# Patient Record
Sex: FEMALE | Race: WHITE | NOT HISPANIC OR LATINO | Employment: FULL TIME | URBAN - METROPOLITAN AREA
[De-identification: names, ages, dates, MRNs, and addresses within clinical notes are randomized per-mention and may not be internally consistent; named-entity substitution may affect disease eponyms.]

---

## 2017-01-03 ENCOUNTER — TELEPHONE (OUTPATIENT)
Dept: GYNECOLOGIC ONCOLOGY | Facility: CLINIC | Age: 57
End: 2017-01-03

## 2017-01-03 NOTE — TELEPHONE ENCOUNTER
Returned pt's call from voice message saying she started having a fever yesterday morning.  States stays down as long as she is on high dose of Ibuprofen, o/w it will get as high as 101.8, she started having dysuria today but no other symptoms.  Instructed pt to call PCP there in [place] to see if she would do a UA C&S since she is local and we are in [place], call me back to let me know when she is going.  Pt verbalized understanding and will call me back.

## 2017-01-03 NOTE — TELEPHONE ENCOUNTER
Pt phoned office back.  States she has an appt at 0745 in am with Margaret Cartagena.  Instructed her to have results faxed to our office, call after visit.  Pt verbalized understanding and will call.

## 2017-01-04 ENCOUNTER — TELEPHONE (OUTPATIENT)
Dept: GYNECOLOGIC ONCOLOGY | Facility: CLINIC | Age: 57
End: 2017-01-04

## 2017-01-04 ENCOUNTER — OFFICE VISIT (OUTPATIENT)
Dept: FAMILY MEDICINE CLINIC | Facility: CLINIC | Age: 57
End: 2017-01-04

## 2017-01-04 VITALS
BODY MASS INDEX: 37.49 KG/M2 | TEMPERATURE: 97.6 F | WEIGHT: 225 LBS | OXYGEN SATURATION: 99 % | SYSTOLIC BLOOD PRESSURE: 142 MMHG | DIASTOLIC BLOOD PRESSURE: 86 MMHG | HEART RATE: 93 BPM | HEIGHT: 65 IN

## 2017-01-04 DIAGNOSIS — R30.0 DYSURIA: Primary | ICD-10-CM

## 2017-01-04 LAB
BILIRUB BLD-MCNC: ABNORMAL MG/DL
CLARITY, POC: CLEAR
COLOR UR: ABNORMAL
GLUCOSE UR STRIP-MCNC: NEGATIVE MG/DL
KETONES UR QL: ABNORMAL
LEUKOCYTE EST, POC: ABNORMAL
LEUKOCYTE ESTERASE URINE, POC: ABNORMAL
NITRITE UR-MCNC: NEGATIVE MG/ML
PH UR: 5.5 [PH] (ref 5–8)
PROT UR STRIP-MCNC: ABNORMAL MG/DL
RBC # UR STRIP: ABNORMAL /UL
RBC # UR STRIP: ABNORMAL /UL
SP GR UR: 1.03 (ref 1–1.03)
SQUAMOUS EPITHELIAL, POC: ABNORMAL
UROBILINOGEN UR QL: NORMAL

## 2017-01-04 PROCEDURE — 99213 OFFICE O/P EST LOW 20 MIN: CPT | Performed by: NURSE PRACTITIONER

## 2017-01-04 PROCEDURE — 81001 URINALYSIS AUTO W/SCOPE: CPT | Performed by: NURSE PRACTITIONER

## 2017-01-04 RX ORDER — CIPROFLOXACIN 500 MG/1
500 TABLET, FILM COATED ORAL 2 TIMES DAILY
Qty: 14 TABLET | Refills: 0 | Status: SHIPPED | OUTPATIENT
Start: 2017-01-04 | End: 2017-02-06 | Stop reason: ALTCHOICE

## 2017-01-04 RX ORDER — NITROFURANTOIN 25; 75 MG/1; MG/1
100 CAPSULE ORAL 2 TIMES DAILY
Qty: 14 CAPSULE | Refills: 0 | Status: SHIPPED | OUTPATIENT
Start: 2017-01-04 | End: 2017-01-04

## 2017-01-04 NOTE — PROGRESS NOTES
Subjective   Tracy Benz is a 56 y.o. female.     History of Present Illness     Dysuria  Mrs. Benz is here today with complaints of 2 days of fever up to 100 as well as some dysuria.  She has recently undergone a total hysterectomy via da Dinora, she called her OB/GYN oncologist yesterday who recommended she have a UA done.  She is feeling somewhat better today however she does continue to have some let her spasm type symptoms as well as dysuria.  She denies any cough, chills or body aches.  She also denies any GI symptoms.  She's had no post operative bleeding or abdominal discomfort.  The following portions of the patient's history were reviewed and updated as appropriate: allergies, current medications, past family history, past medical history, past social history, past surgical history and problem list.    Review of Systems   Constitutional: Positive for fever.   HENT: Negative.    Respiratory: Negative.    Cardiovascular: Negative.    Gastrointestinal:        She does have some suprapubic burning, described as a spasm with urination   Genitourinary: Positive for dysuria, frequency, hematuria and urgency. Negative for difficulty urinating, flank pain, pelvic pain, vaginal bleeding and vaginal pain.       Objective   Physical Exam   Constitutional: She is oriented to person, place, and time. She appears well-developed and well-nourished. No distress.   HENT:   Head: Normocephalic and atraumatic.   Eyes: Conjunctivae and EOM are normal. Pupils are equal, round, and reactive to light.   Neck: Neck supple. No JVD present.   Cardiovascular: Normal rate, regular rhythm, normal heart sounds and intact distal pulses.  Exam reveals no friction rub.    No murmur heard.  Pulmonary/Chest: Effort normal and breath sounds normal. No respiratory distress. She has no wheezes. She has no rales.   Abdominal: Soft. Bowel sounds are normal. She exhibits no distension. There is no tenderness. There is no rebound and  no guarding.   No CVA tenderness   Neurological: She is alert and oriented to person, place, and time.   Skin: Skin is warm.   Vitals reviewed.      Assessment/Plan   Tracy was seen today for urinary tract infection.    Diagnoses and all orders for this visit:    Dysuria  -     POC Urinalysis Dipstick, Automated  -     POC Micro Urine  -     Urine Culture  -     Discontinue: nitrofurantoin, macrocrystal-monohydrate, (MACROBID) 100 MG capsule; Take 1 capsule by mouth 2 (Two) Times a Day.  -     ciprofloxacin (CIPRO) 500 MG tablet; Take 1 tablet by mouth 2 (Two) Times a Day.      UA was significant with moderate leukocytes and blood and nitrite negative.  Her OB/GYN oncologist requested that we fax the results, we have called the office and discussed this with their LPN, we will go ahead and treat her, culture is pending as well.  She will let her OB/GYN oncology no if she has any further or worsening symptoms

## 2017-01-04 NOTE — MR AVS SNAPSHOT
Tracy Benz   1/4/2017 9:45 AM   Office Visit    Dept Phone:  [phone]   Encounter #:  [id number]    Provider:  IDANIA Mota   Department:  [facility]                Your Full Care Plan              Today's Medication Changes          These changes are accurate as of: 1/4/17 10:35 AM.  If you have any questions, ask your nurse or doctor.               Stop taking medication(s)listed here:     ondansetron 4 MG tablet   Commonly known as:  ZOFRAN   Stopped by:  IDANIA Mota           polyethylene glycol packet   Commonly known as:  MIRALAX   Stopped by:  IDANIA Mota                      Your Updated Medication List          This list is accurate as of: 1/4/17 10:35 AM.  Always use your most recent med list.                aspirin 81 MG EC tablet       atorvastatin 20 MG tablet   Commonly known as:  LIPITOR   Take 1 tablet by mouth daily.       CENTRUM MULTIGUMMIES chewable tablet       cholecalciferol 1000 UNITS tablet   Commonly known as:  VITAMIN D3       enalapril 5 MG tablet   Commonly known as:  VASOTEC   Take 1 tablet by mouth daily.       ibuprofen 200 MG tablet   Commonly known as:  ADVIL,MOTRIN   Take 3 tablets by mouth Every 6 (Six) Hours As Needed for mild pain (1-3).       lansoprazole 15 MG capsule   Commonly known as:  PREVACID   Take 1 capsule by mouth daily.       metoprolol succinate XL 25 MG 24 hr tablet   Commonly known as:  TOPROL-XL   Take 1.5 tablets by mouth daily.               We Performed the Following     POC Micro Urine     POC Urinalysis Dipstick, Automated     Urine Culture       You Were Diagnosed With        Codes Comments    Dysuria    -  Primary ICD-10-CM: R30.0  ICD-9-CM: 788.1       Instructions     None    Patient Instructions History      Upcoming Appointments     Visit Type Date Time Department    OFFICE VISIT 1/4/2017  9:45 AM [facility]    POST-OP  "1/27/2017  2:30 PM [facility]      MyChart Signup     Our records indicate that you have declined [facility] [employer] signup. If you would like to sign up for [employer], please email [facility]life[facility] for [email or web address] or call [phone] to obtain an activation code.             Other Info from Your Visit           Your Appointments     Jan 27, 2017  2:30 PM EST   Post-Op with Luisa Riley MD   [facility] GROUP GYNECOLOGIC ONCOLOGY (--)    [address] [id number] [phone]              Allergies     No Known Allergies      Reason for Visit     Urinary Tract Infection           Vital Signs     Blood Pressure Pulse Temperature Height Weight Last Menstrual Period    142/86 93 97.6 °F (36.4 °C) 64.5\" (163.8 cm) 225 lb (102 kg) 07/08/2013 (Approximate)    Oxygen Saturation Body Mass Index Smoking Status             99% 38.02 kg/m2 Never Smoker         Problems and Diagnoses Noted     Difficult or painful urination    -  Primary      Results     POC Urinalysis Dipstick, Automated      Component Value Standard Range & Units    Color Libia Yellow, Straw, Dark Yellow, Libia    Clarity, UA Clear Clear    Glucose, UA Negative Negative, 1000 mg/dL (3+) mg/dL    Bilirubin Small (1+) Negative    Ketones,  mg/dL Negative    Specific Gravity  1.030 1.005 - 1.030    Blood, UA Moderate Negative    pH, Urine 5.5 5.0 - 8.0    Protein,  mg/dL Negative mg/dL    Urobilinogen, UA Normal Normal    Leukocytes Small (1+) Negative    Nitrite, UA Negative Negative                POC Micro Urine      Component Value Standard Range & Units    Leukocytes, UA 5-10     Blood, UA 3+ Negative    Squam Epithel, UA 1-3                     "

## 2017-01-05 ENCOUNTER — TELEPHONE (OUTPATIENT)
Dept: GYNECOLOGIC ONCOLOGY | Facility: CLINIC | Age: 57
End: 2017-01-05

## 2017-01-05 NOTE — TELEPHONE ENCOUNTER
Phoned pt to check on her.  States she started the Cipro last night.  She has an appt on 1-27-17, will call before if needed.

## 2017-01-07 LAB
BACTERIA UR CULT: ABNORMAL
BACTERIA UR CULT: ABNORMAL
OTHER ANTIBIOTIC SUSC ISLT: ABNORMAL

## 2017-01-09 ENCOUNTER — TELEPHONE (OUTPATIENT)
Dept: FAMILY MEDICINE CLINIC | Facility: CLINIC | Age: 57
End: 2017-01-09

## 2017-01-09 NOTE — PROGRESS NOTES
Please let her know her urine did grow out Escherichia coli, which is a very common urinary tract infection.  The medication that we gave her should take care of this infection.  How she feeling?

## 2017-01-09 NOTE — TELEPHONE ENCOUNTER
----- Message from IDANIA Mota sent at 1/9/2017  7:33 AM EST -----  Please let her know her urine did grow out Escherichia coli, which is a very common urinary tract infection.  The medication that we gave her should take care of this infection.  How she feeling?

## 2017-01-09 NOTE — TELEPHONE ENCOUNTER
SHE IS STILL HAVING SOME DISCOLORED URINE ON HER PAD AND IT LOOKS BLOODY COLORED AND SHE IS HAVING SOME BLADDER SPASMS ALSO.  THE MEDICINE SHE IS TAKING IS CIPRO AND SHE THOUGHT YOU HAD TALK TO HER OTHER DOCTOR BEFORE YOU GAVE IT

## 2017-01-11 ENCOUNTER — TELEPHONE (OUTPATIENT)
Dept: GYNECOLOGIC ONCOLOGY | Facility: CLINIC | Age: 57
End: 2017-01-11

## 2017-01-11 NOTE — TELEPHONE ENCOUNTER
Returned pt's call from voice message saying she finished the Cipro but still has urinary frequency, urine has odor, hematuria, but no longer has the fever and chills.  Told her we needed to have another UA C&S done.  Offered her an appt for Friday 1-13-17.  Pt states will do the UA C&S and go from there.  She is going to call her PCP for that since she lives in [place] and her PCP is there as well.  Instructed her to call me update.  Pt verbalized understanding and will call.

## 2017-01-12 ENCOUNTER — OFFICE VISIT (OUTPATIENT)
Dept: FAMILY MEDICINE CLINIC | Facility: CLINIC | Age: 57
End: 2017-01-12

## 2017-01-12 ENCOUNTER — TELEPHONE (OUTPATIENT)
Dept: FAMILY MEDICINE CLINIC | Facility: CLINIC | Age: 57
End: 2017-01-12

## 2017-01-12 VITALS
HEIGHT: 65 IN | DIASTOLIC BLOOD PRESSURE: 78 MMHG | SYSTOLIC BLOOD PRESSURE: 122 MMHG | TEMPERATURE: 98.7 F | HEART RATE: 99 BPM | BODY MASS INDEX: 37.32 KG/M2 | WEIGHT: 224 LBS | OXYGEN SATURATION: 98 %

## 2017-01-12 DIAGNOSIS — N92.0 SPOTTING: ICD-10-CM

## 2017-01-12 DIAGNOSIS — R30.0 DYSURIA: Primary | ICD-10-CM

## 2017-01-12 LAB
BILIRUB BLD-MCNC: ABNORMAL MG/DL
CLARITY, POC: CLEAR
COLOR UR: YELLOW
GLUCOSE UR STRIP-MCNC: NEGATIVE MG/DL
KETONES UR QL: ABNORMAL
LEUKOCYTE EST, POC: ABNORMAL
LEUKOCYTE ESTERASE URINE, POC: 2
NITRITE UR-MCNC: NEGATIVE MG/ML
PH UR: 5 [PH] (ref 5–8)
PROT UR STRIP-MCNC: ABNORMAL MG/DL
RBC # UR STRIP: ABNORMAL /UL
RBC # UR STRIP: ABNORMAL /UL
SP GR UR: 1.03 (ref 1–1.03)
SQUAMOUS EPITHELIAL, POC: 10
UROBILINOGEN UR QL: NORMAL

## 2017-01-12 PROCEDURE — 99213 OFFICE O/P EST LOW 20 MIN: CPT | Performed by: NURSE PRACTITIONER

## 2017-01-12 PROCEDURE — 81001 URINALYSIS AUTO W/SCOPE: CPT | Performed by: NURSE PRACTITIONER

## 2017-01-12 RX ORDER — AMOXICILLIN AND CLAVULANATE POTASSIUM 875; 125 MG/1; MG/1
1 TABLET, FILM COATED ORAL 2 TIMES DAILY
Qty: 14 TABLET | Refills: 0 | Status: SHIPPED | OUTPATIENT
Start: 2017-01-12 | End: 2017-02-06 | Stop reason: ALTCHOICE

## 2017-01-12 RX ORDER — TRIAMCINOLONE ACETONIDE 1 MG/G
CREAM TOPICAL
COMMUNITY
Start: 2016-10-12 | End: 2017-02-06

## 2017-01-12 NOTE — PROGRESS NOTES
"Subjective   Tracy Benz is a 56 y.o. female.     History of Present Illness     Follow-up UTI  Tracy is here today to discuss some persistent problems she is having since her UTI.  Urine culture did come back positive for Escherichia coli which should've been responsive to Cipro.  She's had no further fevers but she does feel a \"twinge\" in the suprapubic area and has noticed some blood on her pad that she is wearing.  She denies any actual dysuria, urinary hesitancy or urgency.  She did call her surgeon who did her recent hysterectomy, they did offer her an appointment however she chose to come here for now.    The following portions of the patient's history were reviewed and updated as appropriate: allergies, current medications, past family history, past medical history, past social history, past surgical history and problem list.    Review of Systems   Constitutional: Negative.    Gastrointestinal: Negative for abdominal distention, abdominal pain, blood in stool, constipation, diarrhea, nausea and vomiting.   Genitourinary: Positive for hematuria (uestionable hematuria versus vaginal spotting). Negative for decreased urine volume, difficulty urinating, dysuria, flank pain, frequency, pelvic pain, urgency and vaginal pain.   Neurological: Negative.        Objective   Physical Exam   Constitutional: She is oriented to person, place, and time. She appears well-developed and well-nourished. No distress.   HENT:   Head: Normocephalic.   Abdominal: Soft. Bowel sounds are normal. She exhibits no distension. There is no tenderness. There is no rebound and no guarding.   Neurological: She is alert and oriented to person, place, and time.   Skin: Skin is warm.       Assessment/Plan   Tracy was seen today for difficulty urinating.    Diagnoses and all orders for this visit:    Dysuria  -     POC Urinalysis Dipstick, Automated  -     POC Micro Urine  -     amoxicillin-clavulanate (AUGMENTIN) 875-125 MG per tablet; " Take 1 tablet by mouth 2 (Two) Times a Day.    Spotting      She Is not actually having dysuria only a suprapubic spasm-type feeling at times.  Recent Escherichia coli UTI which should've been responsive to the Cipro according to sensitivity.  She did bring in a pad to me today and there was some spotting on the pad she's having no gushing.  UA analysis showed 2-3+ blood and trace leukocytes on strip however on microscopic exam there were too numerous to count epithelial cells 2-3 red blood cells per high-power field and occasional white blood cell.  We did not get enough urine to send for culture, I did go ahead and give her a urine cup to bring me another specimen to send for culture again later today.  I'm not certain at this spotting is coming from urinary source, she declines pelvic exam as she was told not to insert anything into her vagina at all until she sees her surgeon, she is going to call her surgeon and get an appointment set up as he did offer her one when she called there this morning.  I am going go ahead and cover her with Augmentin in the event that there was another bacteria or this is in fact urinary source, I will call her when culture and sensitivity report is received.    EMR Dragon/Transcription disclaimer:  Much of this encounter note is an electronic transcription of spoken language to printed text. Electronic transcription of spoken language may permit erroneous, or at times, nonsensical words or phrases to be inadvertently transcribed. Although I have reviewed the note for such errors, some may still exist.

## 2017-01-12 NOTE — MR AVS SNAPSHOT
Tracy Benz   1/12/2017 9:30 AM   Office Visit    Dept Phone:  [phone]   Encounter #:  [id number]    Provider:  IDANIA Mota   Department:  [facility]                Your Full Care Plan              Today's Medication Changes          These changes are accurate as of: 1/12/17 10:25 AM.  If you have any questions, ask your nurse or doctor.               New Medication(s)Ordered:     amoxicillin-clavulanate 875-125 MG per tablet   Commonly known as:  AUGMENTIN   Take 1 tablet by mouth 2 (Two) Times a Day.   Started by:  IDANIA Mota            Where to Get Your Medications      These medications were sent to [facility] [address] [place] [address] [address] AT [facility] [address] - [phone]  - [phone]   [address] [address]     Phone:  [phone]     amoxicillin-clavulanate 875-125 MG per tablet                  Your Updated Medication List          This list is accurate as of: 1/12/17 10:25 AM.  Always use your most recent med list.                amoxicillin-clavulanate 875-125 MG per tablet   Commonly known as:  AUGMENTIN   Take 1 tablet by mouth 2 (Two) Times a Day.       aspirin 81 MG EC tablet       atorvastatin 20 MG tablet   Commonly known as:  LIPITOR   Take 1 tablet by mouth daily.       CENTRUM MULTIGUMMIES chewable tablet       cholecalciferol 1000 UNITS tablet   Commonly known as:  VITAMIN D3       ciprofloxacin 500 MG tablet   Commonly known as:  CIPRO   Take 1 tablet by mouth 2 (Two) Times a Day.       enalapril 5 MG tablet   Commonly known as:  VASOTEC   Take 1 tablet by mouth daily.       ibuprofen 200 MG tablet   Commonly known as:  ADVIL,MOTRIN   Take 3 tablets by mouth Every 6 (Six) Hours As Needed for mild pain (1-3).       lansoprazole 15 MG capsule   Commonly known as:  PREVACID   Take 1 capsule by mouth daily.       metoprolol succinate  "XL 25 MG 24 hr tablet   Commonly known as:  TOPROL-XL   Take 1.5 tablets by mouth daily.       triamcinolone 0.1 % cream   Commonly known as:  KENALOG               We Performed the Following     POC Micro Urine     POC Urinalysis Dipstick, Automated       You Were Diagnosed With        Codes Comments    Dysuria    -  Primary ICD-10-CM: R30.0  ICD-9-CM: 788.1       Instructions     None    Patient Instructions History      Upcoming Appointments     Visit Type Date Time Department    SAME DAY 1/12/2017  9:30 AM [facility]    POST-OP 1/27/2017  2:30 PM [facility]      MyChart Signup     Our records indicate that you have declined [employer] signup. If you would like to sign up for [employer], please email [email or web address] or call [phone] to obtain an activation code.             Other Info from Your Visit           Your Appointments     Jan 27, 2017  2:30 PM EST   Post-Op with Luisa Riley MD   [facility]Y (--)    [address] [place] [id number] [phone]              Allergies     No Known Allergies      Reason for Visit     Difficulty Urinating           Vital Signs     Blood Pressure Pulse Temperature Height Weight Last Menstrual Period    122/78 99 98.7 °F (37.1 °C) 64.5\" (163.8 cm) 224 lb (102 kg) 07/08/2013 (Approximate)    Oxygen Saturation Body Mass Index Smoking Status             98% 37.86 kg/m2 Never Smoker         Problems and Diagnoses Noted     Difficult or painful urination    -  Primary      Results     POC Urinalysis Dipstick, Automated      Component Value Standard Range & Units    Color Yellow Yellow, Straw, Dark Yellow, Libia    Clarity, UA Clear Clear    Glucose, UA Negative Negative, 1000 mg/dL (3+) mg/dL    Bilirubin Small (1+) Negative    Ketones, UA Trace Negative    Specific Gravity  1.030 1.005 - 1.030    Blood, UA Moderate Negative    pH, Urine 5.0 5.0 - 8.0    Protein, POC " 30 mg/dL Negative mg/dL    Urobilinogen, UA Normal Normal    Leukocytes Trace Negative    Nitrite, UA Negative Negative                POC Micro Urine      Component Value Standard Range & Units    Leukocytes, UA 2     Blood, UA 3+ Negative    Squam Epithel, UA 10

## 2017-01-14 LAB
BACTERIA UR CULT: NORMAL
BACTERIA UR CULT: NORMAL

## 2017-01-16 ENCOUNTER — TELEPHONE (OUTPATIENT)
Dept: GYNECOLOGIC ONCOLOGY | Facility: CLINIC | Age: 57
End: 2017-01-16

## 2017-01-16 ENCOUNTER — TELEPHONE (OUTPATIENT)
Dept: FAMILY MEDICINE CLINIC | Facility: CLINIC | Age: 57
End: 2017-01-16

## 2017-01-16 NOTE — TELEPHONE ENCOUNTER
Returned pt's call from voice message saying she still had a little spotting.  States she no longer has the urinary issues, but still has just a little spotting which is improving.  She has an appt with Dr. Riley next week.  Offered pt an appointment for sooner, transferred to reception to schedule.

## 2017-01-16 NOTE — TELEPHONE ENCOUNTER
Called Tracy back and she said that she was waiting on the urine culture results before she called the oncologist.  Tracy also wanted to let you know that she has noticed a significant decrease in bleeding over the last 24 hours.  Tracy says she is calling the oncologist today and will call us and let us know.

## 2017-01-16 NOTE — TELEPHONE ENCOUNTER
----- Message from IDANIA Mota sent at 1/16/2017  7:32 AM EST -----  Let her know her urine did not grow out any bacteria.  How is she feeling?

## 2017-01-16 NOTE — TELEPHONE ENCOUNTER
Tracy states that she feeling pretty good today and advised that her culture didn't grow any bacteria.  She will call the clinic if anything changes.

## 2017-01-27 ENCOUNTER — OFFICE VISIT (OUTPATIENT)
Dept: GYNECOLOGIC ONCOLOGY | Facility: CLINIC | Age: 57
End: 2017-01-27

## 2017-01-27 VITALS
DIASTOLIC BLOOD PRESSURE: 88 MMHG | BODY MASS INDEX: 37.69 KG/M2 | RESPIRATION RATE: 16 BRPM | TEMPERATURE: 98.7 F | SYSTOLIC BLOOD PRESSURE: 138 MMHG | OXYGEN SATURATION: 98 % | WEIGHT: 223 LBS | HEART RATE: 93 BPM

## 2017-01-27 DIAGNOSIS — C54.1 ENDOMETRIAL CANCER (HCC): Primary | ICD-10-CM

## 2017-01-27 PROCEDURE — 99213 OFFICE O/P EST LOW 20 MIN: CPT | Performed by: OBSTETRICS & GYNECOLOGY

## 2017-01-27 PROCEDURE — 99024 POSTOP FOLLOW-UP VISIT: CPT | Performed by: OBSTETRICS & GYNECOLOGY

## 2017-01-27 NOTE — PROGRESS NOTES
"Subjective     Reason for visit:  Postoperative and pathology discussion    History of present illness:  The patient is a 56 y.o. 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  She is recently s/p minimally invasive staging surgery and returns today for a post-op visit and pathology discussion.    She is doing well from a post-operative standpoint.  Her pain has resolved and she no longer requires pain medication.  About two weeks ago she started having some scant spotting of discharge.  She denies any nausea, vomiting.  Her appetite is back to normal.  Normal bowel and bladder function.  She was treated for an EColi UTI shortly after surgery by her PCP.  No fevers or chills.  No chest pain or shortness of breath.  Energy has returned to baseline.    Apart from this she is obese and she continues her efforts at weight loss.  She has lost 20 pounds over the past year since being diagnosed with diabetes and has cut out sugars.  In terms of the type 2 diabetes, pre-op her hemoglobin A1c was 6.4%.  She manages this with diet.  She has hypertension and this is well controlled on several agents.  She recalls having a cardiac catheterization in  for a \"small blockage\".  She is been maintained on low-dose aspirin since then.  She does not follow with cardiology.  Her father  of an MI at the age of 47.  She has arthritis and takes ibuprofen regularly along with Prevacid as a protectant.    Treatment History:  1.  Presented as a referral from Dr. 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  The polypoid tissue from the lower uterine segment and endocervix showed stromal invasion.  2.  Pre-operative CT Abdomen/Pelvis was negative.  On 16 she underwent a RATLH/BSO/limited LND as intra-op frozen section showed no residual cancer  3.  Final pathology showed a small " focus of grade 1 endometrioid adenocarcinoma superficially invasive 2 mm of 2.5 cm.  No additional cervical involvement.  There were 0/5 pelvic lymph nodes sampled.  No LVSI.  Insufficient tissue for MSI testing.    OBGYN History: She is nulliparous.  No history of abnormal paps.      Past Medical History   Diagnosis Date   • Anxiety    • Arthritis    • BMI 38.0-38.9,adult    • Diabetes mellitus type 2, diet-controlled    • Endometrial cancer    • Hyperlipemia    • Hypertension    • Palpitations        Past Surgical History   Procedure Laterality Date   • Breast biopsy     • Knee arthroscopy w/ partial medial meniscectomy     • Tonsillectomy     • Laparoscopic cholecystectomy  1999   • Total laparoscopic hysterectomy N/A 12/20/2016     RATLH/BSO/Staging       MEDICATIONS: The current medication list was reviewed with the patient and updated in the EMR this date per the Medical Assistant. Medication dosages and frequencies were confirmed to be accurate.      Allergies:  has No Known Allergies.    Social History: She lives approximately 30 minutes away.  She has a cat.  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  She denies use of tobacco, alcohol, and illicit drugs.     Family History:  Her maternal uncle suffered from prostate cancer in his 60s.  Denies a history of breast, colon, endometrial, and ovarian cancer.  No history of melanoma.    Health Maintenance:    1. Mammogram - 7/2016  2. Colonoscopy - never    Review of Systems:  CONSTITUTIONAL:  Weight has been stable, no excessive fatigue.  No fever, chills, night sweats.  PSYCHIATRIC: +anxiety.  No history of depression. No bipolar disorder or insomnia.  EYES: Vision is unchanged.  No photophobia.  EARS, NOSE, MOUTH, AND THROAT: Hearing normal. No swallowing difficulties.  No sore throat.  ENDOCRINE: +DM2.  No history of thyroid disease.  LYMPHATIC: No enlarged lymph nodes  RESPIRATORY: No shortness of breath, cough, asthma or wheezing.  No hemoptysis.  CARDIOVASCULAR:  +palpitations.  No angina, orthopnea, or edema.  +HTN.  No hyperlipidemia.  GASTROINTESTINAL: No constipation, diarrhea, or melena.  No reflux.  No nausea, vomiting.  GENITOURINARY: +MARCELO.  No dysuria, hematuria, urgency, or frequency.  NEUROLOGIC: No numbness, weakness, motor disturbance, syncope, seizure, or headaches.  MUSCULOSKELETAL: +joint pain.  No muscle weakness.  INTEGUMENTARY: No new skin lesions.  GYNECOLOGIC: Per history of present illness.  HEMATOLOGIC: No bleeding problems.  Denies easy bruising.    Objective      Physical Exam  Vitals:    01/27/17 1430   BP: 138/88   Pulse: 93   Resp: 16   Temp: 98.7 °F (37.1 °C)   SpO2: 98%     Body mass index is 37.69 kg/(m^2).    Wt Readings from Last 3 Encounters:   01/27/17 223 lb (101 kg)   01/12/17 224 lb (102 kg)   01/04/17 225 lb (102 kg)     ECOG PS 0    GENERAL: Alert, well-appearing female in no apparent distress.  She appears her stated age.  She is here by herself.  HEENT: Sclera anicteric, normal eyelids. Head normocephalic, atraumatic. Mucus membranes moist.  Normal dentition.    GASTROINTESTINAL:  Soft, non-tender, non-distended, no rebound or guarding, no masses, or hernias.  No HSM.  Incisions - healing laparoscopic incisions  MUSCULOSKELETAL:  Normal gait and station.  FROMx4.  No digital cyanosis.    SKIN:  Warm, dry, well-perfused.  All visible areas intact.  No rashes, lesions, ulcers.  PSYCHIATRIC: AO x3, with appropriate affect, normal thought processes  PELVIC exam:  Normal external female genitalia without lesions or skin changes.  Urethra midline.  Vagina without lesions.  Cuff intact, healing, and without visible lesions.  On bimanual exam vagina and cuff are smooth and without nodularity.  No palpable abdominal masses. Rectovaginal exam deferred      Diagnostic Data:    Final Diagnosis   1. UTERUS, HYSTERECTOMY WITH BILATERAL SALPINGO-OOPHORECTOMY:  Small focus of superficially invasive low-grade endometrioid adenocarcinoma with squamous  metaplasia. (See template) No cervical or parametrial involvement identified.  Adenomyosis. Leiomyomata.  No significant histopathologic abnormalities of the right and left ovaries and fallopian tubes.   2. LEFT PELVIC LYMPH NODES:  No metastasis identified. (0/2)  3. RIGHT PELVIC LYMPH NODES:  No metastasis identified (0/3).  UTERUS ENDOMETRIUM TEMPLATE:  SPECIMEN TYPE/ PROCEDURE: Radical hysterectomy with node sampling  LYMPH NODE SAMPLING: yes  SPECIMEN INTEGRITY: intact  TUMOR SITE: endometrium  TUMOR SIZE (greatest dimension): Approximately 2 mm per slide measurement  HISTOLOGIC TYPE: Endometrioid adenocarcinoma with squamous differentiation  HISTOLOGIC GRADE: Grade 1  MYOMETRIAL INVASION: 2 mm of a 2.5 cm myometrial thickness  INVOLVEMENT OF CERVIX: none seen  EXTENT OF INVOLVEMENT OF OTHER ORGANS: n/a  MARGINS: free  LYMPHVASCULAR INVASION: None seen  MSI TESTING (under age 60): Insufficient tumor for testing  REGIONAL LYMPH NODE STATUS: Right and left pelvic nodes negative  ADDITIONAL PATHOLOGIC FINDINGS: Leiomyomata, adenomyosis  OTHER STUDIES: n/a  AJCC PATHOLOGIC STAGE: (COMPLETED BY PATHOLOGIST, BASED ONLY ON TISSUE FINDINGS, MORE EXTENSIVE DISEASE MAY NOT BE KNOWN TO THE PATHOLOGIST)  pT= 1a  pN= 0  AJCC PATHOLOGIC STAGE: IA         Assessment/Plan  This is a 56 y.o. 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  Postoperative:  She has done well from a post-operative standpoint.  She may resume normal activities including driving and heavy lifting.  She knows to defer sexual activity until the 8-10 week kia given the known cautery effect to the top of the vagina after robotic surgery.    2. Endometrial cancer: I reviewed the results of the pathology report in detail and valentina pictures to illustrate the findings. The patient was also given a copy of the report.  She understands she technically has a stage II disease given the small focus of endocervical stromal involvement  seen on her MyoSure pathology.  Remaining tumor features are most consistent with low risk stage 1A minimally invasive grade 1 endometrioid disease.  Her chance for cancer recurrence is approximately 3-5%.    Given the cervical involvement, I do feel she would benefit from vagina cuff brachytherapy.  I reviewed this as an adjuvant treatment modality and valentina pictures. She understands this is a well tolerated treatment that carries minimal toxicity. The goal is to treat the vaginal cuff as generally with endometrioid histology this is the most likely area for cancer to recur, if it were to recur.  I placed a referral to Dr. Livingston.  She understands typically we like to complete treatment with vaginal cuff brachytherapy within 12 weeks of surgery however do not typically start earlier than 8 weeks to allow the vaginal cuff to heal.    Thereafter she understands the typical recommendation for intermediate risk endometrial cancer surveillance (stage IB or II) are visits every 3 months for the first year, every 6 months up until 5 years, and annually thereafter.  However with the majority of her tumor features more like stage 1A disease, we can likely follow the less rigorous schedule of visits every 6 months for the first year and annually thereafter.  We discussed need to monitor symptoms including any abdominal pain, bloating, changes in bowel or bladder habits.  She knows to be particularly aware of symptoms that occur several times a week for at least several weeks.  She is to call immediately with any vaginal bleeding or abnormal discharge.  We also discussed the role of physical exam and low threshold for imaging.     3.  Routine health maintenance:  She is up to date on her mammogram.  She is due for a colonoscopy and will coordinate this with her PCP.  I would like for her to have a clinical breast exam annually.    4.  Follow-up:  RTC in 6 months.  Refer to survivorship thereafter.  She knows to call sooner with  any questions or concerns.      Modifier [id number] applies to this visit as at least 10 of 15 minutes was spent face to face counseling the patient on her cancer diagnosis, prognosis, recommendation for adjuvant treatment, and need for surveillance.          Electronically Signed by: Luisa Riley MD  Date: 1/27/2017      Time:  2:34 PM    Note: Speech recognition transcription software was used to dictate portions of this document.  An attempt at proofreading has been made though minor errors in transcription may still be present.  Please do not hesitate to call our office with any questions.

## 2017-01-27 NOTE — MR AVS SNAPSHOT
Tracy Benz   1/27/2017 2:30 PM   Office Visit    Dept Phone:  [phone]   Encounter #:  [id number]    Provider:  Luisa Riley MD   Department:  [facility] GYNECOLOGIC ONCOLOGY                Your Full Care Plan              Your Updated Medication List          This list is accurate as of: 1/27/17  3:06 PM.  Always use your most recent med list.                amoxicillin-clavulanate 875-125 MG per tablet   Commonly known as:  AUGMENTIN   Take 1 tablet by mouth 2 (Two) Times a Day.       aspirin 81 MG EC tablet       atorvastatin 20 MG tablet   Commonly known as:  LIPITOR   Take 1 tablet by mouth daily.       CENTRUM MULTIGUMMIES chewable tablet       cholecalciferol 1000 UNITS tablet   Commonly known as:  VITAMIN D3       ciprofloxacin 500 MG tablet   Commonly known as:  CIPRO   Take 1 tablet by mouth 2 (Two) Times a Day.       enalapril 5 MG tablet   Commonly known as:  VASOTEC   Take 1 tablet by mouth daily.       ibuprofen 200 MG tablet   Commonly known as:  ADVIL,MOTRIN   Take 3 tablets by mouth Every 6 (Six) Hours As Needed for mild pain (1-3).       lansoprazole 15 MG capsule   Commonly known as:  PREVACID   Take 1 capsule by mouth daily.       metoprolol succinate XL 25 MG 24 hr tablet   Commonly known as:  TOPROL-XL   Take 1.5 tablets by mouth daily.       triamcinolone 0.1 % cream   Commonly known as:  KENALOG               We Performed the Following     Ambulatory Referral to Radiation Oncology       You Were Diagnosed With        Codes Comments    Endometrial cancer    -  Primary ICD-10-CM: C54.1  ICD-9-CM: 182.0       Instructions     None    Patient Instructions History      Upcoming Appointments     Visit Type Date Time Department    POST-OP 1/27/2017  2:30 PM [facility]    RO CONSULT 2/6/2017 10:30 AM [facility]    FOLLOW UP 1 UNIT 7/28/2017  9:00 AM [facility]      MyChart Signup     Our records indicate that you have  declined Psychiatric MyChart signup. If you would like to sign up for [employer], please email [email or web address] or call [phone] to obtain an activation code.             Other Info from Your Visit           Your Appointments     Feb 06, 2017 10:30 AM EST   RO CONSULT with Maegan Livingston MD   [facility] (--)    [address] [facility] [id number] [phone]            Jul 28, 2017  9:00 AM EDT   FOLLOW UP with Luisa Riley MD   [facility] (--)    [address] [address] [phone]              Allergies     No Known Allergies      Reason for Visit     Follow-up           Vital Signs     Blood Pressure Pulse Temperature Respirations Weight Last Menstrual Period    138/88 93 98.7 °F (37.1 °C) (Temporal Artery ) 16 223 lb (101 kg) 07/08/2013 (Approximate)    Oxygen Saturation Body Mass Index Smoking Status             98% 37.69 kg/m2 Never Smoker         Problems and Diagnoses Noted     Endometrial cancer

## 2017-02-06 ENCOUNTER — OFFICE VISIT (OUTPATIENT)
Dept: RADIATION ONCOLOGY | Facility: HOSPITAL | Age: 57
End: 2017-02-06

## 2017-02-06 ENCOUNTER — HOSPITAL ENCOUNTER (OUTPATIENT)
Dept: RADIATION ONCOLOGY | Facility: HOSPITAL | Age: 57
Setting detail: RADIATION/ONCOLOGY SERIES
Discharge: HOME | End: 2017-02-06

## 2017-02-06 VITALS
WEIGHT: 225.3 LBS | TEMPERATURE: 98.1 F | BODY MASS INDEX: 37.54 KG/M2 | SYSTOLIC BLOOD PRESSURE: 182 MMHG | DIASTOLIC BLOOD PRESSURE: 81 MMHG | HEIGHT: 65 IN | HEART RATE: 88 BPM

## 2017-02-06 DIAGNOSIS — C54.1 ENDOMETRIAL CANCER (HCC): Primary | ICD-10-CM

## 2017-02-06 NOTE — PROGRESS NOTES
CONSULTATION NOTE    NAME:      Tracy Benz  :                                                          1960  DATE OF CONSULTATION:                       2017   REQUESTING PHYSICIAN:                   Luisa Riley, *  REASON FOR CONSULTATION:           Endometrial cancer          Staging form: Corpus Uteri - Carcinoma, AJCC V7           Clinical stage from 2016: FIGO Stage II (T2, N0, M0)            Pathologic stage from 2016: FIGO Stage II (T2, N0, cM0)            BRIEF HISTORY:  Tracy Benz  is a very pleasant 56 y.o. female  who was found to have postmenopausal bleeding that resolved.  She underwent hysteroscopy with D&C and intraoperatively had a friable appearing polyp protruding from external cervical os measuring at least 1 cm in diameter.  The pathology showed endometrioid adenocarcinoma FIGO grade 1, the polypoid tissue from the lower uterine segment and endocervix showed stromal invasion.  She underwent hysterectomy and was found to have a small focus of superficially invasive low grade endometrioid adenocarcinoma with no further cervical or parametrial involvement.  0 of 2 left and 0 of 3 right pelvic nodes were positive for tumor there was 2 mm of 2.5 cm myometrial thickness.  She has recovered well from surgery and is here to discuss postoperative radiotherapy.  No Known Allergies    Social History   Substance Use Topics   • Smoking status: Never Smoker   • Smokeless tobacco: Never Used   • Alcohol use No       Past Medical History   Diagnosis Date   • Anxiety    • Arthritis    • BMI 38.0-38.9,adult    • Diabetes mellitus type 2, diet-controlled    • Endometrial cancer    • Hyperlipemia    • Hypertension    • Palpitations        family history includes Diabetes type II in her father and mother; Heart attack in her father; Heart failure in her mother; Hypertension in her mother.     Past Surgical History   Procedure Laterality Date   • Breast biopsy     •  "Knee arthroscopy w/ partial medial meniscectomy     • Tonsillectomy     • Laparoscopic cholecystectomy  1999   • Total laparoscopic hysterectomy N/A 12/20/2016     RATLH/BSO/Staging        Review of Systems   All other systems reviewed and are negative.          Objective   VITAL SIGNS:   Vitals:    02/06/17 1058   BP: (!) 182/81   Pulse: 88   Temp: 98.1 °F (36.7 °C)   Weight: 225 lb 4.8 oz (102 kg)   Height: 64.5\" (163.8 cm)   PainSc: 0-No pain        KPS        90%    Physical Exam   Constitutional: She is oriented to person, place, and time. She appears well-developed and well-nourished. No distress.   HENT:   Head: Normocephalic and atraumatic.   Eyes: EOM are normal. No scleral icterus.   Neck: Neck supple.   Cardiovascular: Normal rate, regular rhythm and normal heart sounds.  Exam reveals no gallop and no friction rub.    No murmur heard.  Pulmonary/Chest: Effort normal and breath sounds normal.   Abdominal: Soft. She exhibits no distension. There is no tenderness.   Genitourinary: Vagina normal. Pelvic exam was performed with patient supine. No erythema, tenderness or bleeding in the vagina. No foreign body in the vagina. No signs of injury around the vagina. No vaginal discharge found.   Musculoskeletal: She exhibits no edema.   Lymphadenopathy:     She has no cervical adenopathy.   Neurological: She is alert and oriented to person, place, and time.   Skin: Skin is warm and dry.   Nursing note and vitals reviewed.       The following portions of the patient's history were reviewed and updated as appropriate: allergies, current medications, past family history, past medical history, past social history, past surgical history and problem list.    Assessment        IMPRESSION: Pathologic stage from 12/22/2016: FIGO Stage II (T2, N0, cM0)     RECOMMENDATIONS:  Based on the pathologic findings I recommend vaginal brachytherapy to the upper 2/3 of the vagina.  She had T2 grade 1 disease and vaginal brachytherapy " or observation is recommended.  She wants to undergo treatment.  I recommend 6 Felipe ×5 treatments to the vaginal mucosa.  The pros and cons, risks and benefits were discussed and an appointment made for her to return for treatment planning.    Return for Radiation Treatment/planning.  There are no diagnoses linked to this encounter.            Maegan Livingston MD      Errors in dictation may reflect use of voice recognition software and not all errors in transcription may have been detected prior to signing.

## 2017-02-07 RX ORDER — METOPROLOL SUCCINATE 25 MG/1
TABLET, EXTENDED RELEASE ORAL
Qty: 45 TABLET | Refills: 0 | Status: SHIPPED | OUTPATIENT
Start: 2017-02-07 | End: 2017-03-03 | Stop reason: SDUPTHER

## 2017-02-13 ENCOUNTER — DOCUMENTATION (OUTPATIENT)
Dept: RADIATION ONCOLOGY | Facility: HOSPITAL | Age: 57
End: 2017-02-13

## 2017-02-13 NOTE — PROGRESS NOTES
Pt called stating that she has allergies that effects her eyes and was given steroid drops.  She will start them today to clear the redness in her eyes.  She is to be simmed on 2/17 for cylinders x 5.

## 2017-02-17 ENCOUNTER — HOSPITAL ENCOUNTER (OUTPATIENT)
Dept: RADIATION ONCOLOGY | Facility: HOSPITAL | Age: 57
Discharge: HOME | End: 2017-02-17

## 2017-02-17 PROCEDURE — 77470 SPECIAL RADIATION TREATMENT: CPT | Performed by: RADIOLOGY

## 2017-02-17 PROCEDURE — 77290 THER RAD SIMULAJ FIELD CPLX: CPT | Performed by: RADIOLOGY

## 2017-02-20 RX ORDER — ENALAPRIL MALEATE 5 MG/1
TABLET ORAL
Qty: 30 TABLET | Refills: 0 | Status: SHIPPED | OUTPATIENT
Start: 2017-02-20 | End: 2017-03-16 | Stop reason: SDUPTHER

## 2017-02-21 PROCEDURE — 77370 RADIATION PHYSICS CONSULT: CPT | Performed by: OBSTETRICS & GYNECOLOGY

## 2017-02-21 PROCEDURE — 77316 BRACHYTX ISODOSE PLAN SIMPLE: CPT | Performed by: RADIOLOGY

## 2017-02-21 PROCEDURE — 77316 BRACHYTX ISODOSE PLAN SIMPLE: CPT | Performed by: OBSTETRICS & GYNECOLOGY

## 2017-02-22 ENCOUNTER — HOSPITAL ENCOUNTER (OUTPATIENT)
Dept: RADIATION ONCOLOGY | Facility: HOSPITAL | Age: 57
Discharge: HOME | End: 2017-02-22

## 2017-02-22 PROCEDURE — C1717 BRACHYTX, NON-STR,HDR IR-192: HCPCS | Performed by: RADIOLOGY

## 2017-02-22 PROCEDURE — 57156 INS VAG BRACHYTX DEVICE: CPT | Performed by: RADIOLOGY

## 2017-02-22 PROCEDURE — 77770 HDR RDNCL NTRSTL/ICAV BRCHTX: CPT | Performed by: RADIOLOGY

## 2017-02-22 NOTE — POST-PROCEDURE NOTE
02/22/2017  Tracy Benz    [facility]   1/5  Patient presents for cylinder brachytherapy.  She has no complaints of urinary discomfort, diarrhea or vaginal irritation.  The cylinder was inserted and treatment of 6 Gy to the surface of the upper vaginal mucosa was delivered. Patient tolerated procedure well with no complications.  Physics survey was negative with no residual radioactivity.  Discharged to home in good condition.  She will return for next treatment.

## 2017-02-24 ENCOUNTER — HOSPITAL ENCOUNTER (OUTPATIENT)
Dept: RADIATION ONCOLOGY | Facility: HOSPITAL | Age: 57
Discharge: HOME | End: 2017-02-24

## 2017-02-24 PROCEDURE — 57156 INS VAG BRACHYTX DEVICE: CPT | Performed by: RADIOLOGY

## 2017-02-24 PROCEDURE — 77770 HDR RDNCL NTRSTL/ICAV BRCHTX: CPT | Performed by: RADIOLOGY

## 2017-02-24 PROCEDURE — C1717 BRACHYTX, NON-STR,HDR IR-192: HCPCS | Performed by: RADIOLOGY

## 2017-02-24 NOTE — POST-PROCEDURE NOTE
02/24/2017  Tracy Benz    [facility]#   2/5  Patient presents for cylinder brachytherapy.  She has no complaints of urinary discomfort, diarrhea or vaginal irritation.  The cylinder was inserted and treatment of 6 Gy to the surface of the upper vaginal mucosa was delivered. Patient tolerated procedure well with no complications.  Physics survey was negative with no residual radioactivity.  Discharged to home in good condition.  She will return for next treatment.

## 2017-02-27 ENCOUNTER — HOSPITAL ENCOUNTER (OUTPATIENT)
Dept: RADIATION ONCOLOGY | Facility: HOSPITAL | Age: 57
Discharge: HOME | End: 2017-02-27

## 2017-02-27 PROCEDURE — 57156 INS VAG BRACHYTX DEVICE: CPT | Performed by: RADIOLOGY

## 2017-02-27 PROCEDURE — C1717 BRACHYTX, NON-STR,HDR IR-192: HCPCS | Performed by: RADIOLOGY

## 2017-02-27 PROCEDURE — 77770 HDR RDNCL NTRSTL/ICAV BRCHTX: CPT | Performed by: RADIOLOGY

## 2017-02-27 NOTE — POST-PROCEDURE NOTE
02/27/2017  Tracy Benz    [facility]#   3  Patient presents for cylinder brachytherapy.  She has no complaints of urinary discomfort, diarrhea or vaginal irritation.  The cylinder was inserted and treatment of 6 Gy to the surface of the upper vaginal mucosa was delivered. Patient tolerated procedure well with no complications.  Physics survey was negative with no residual radioactivity.  Discharged to home in good condition.  She will return for next treatment.

## 2017-03-01 ENCOUNTER — HOSPITAL ENCOUNTER (OUTPATIENT)
Dept: RADIATION ONCOLOGY | Facility: HOSPITAL | Age: 57
Setting detail: RADIATION/ONCOLOGY SERIES
Discharge: HOME | End: 2017-03-01

## 2017-03-01 ENCOUNTER — HOSPITAL ENCOUNTER (OUTPATIENT)
Dept: RADIATION ONCOLOGY | Facility: HOSPITAL | Age: 57
Discharge: HOME | End: 2017-03-01

## 2017-03-01 PROCEDURE — C1717 BRACHYTX, NON-STR,HDR IR-192: HCPCS | Performed by: RADIOLOGY

## 2017-03-01 PROCEDURE — 57156 INS VAG BRACHYTX DEVICE: CPT | Performed by: RADIOLOGY

## 2017-03-01 PROCEDURE — 77770 HDR RDNCL NTRSTL/ICAV BRCHTX: CPT | Performed by: RADIOLOGY

## 2017-03-03 ENCOUNTER — HOSPITAL ENCOUNTER (OUTPATIENT)
Dept: RADIATION ONCOLOGY | Facility: HOSPITAL | Age: 57
Discharge: HOME | End: 2017-03-03

## 2017-03-03 PROCEDURE — 57156 INS VAG BRACHYTX DEVICE: CPT | Performed by: RADIOLOGY

## 2017-03-03 PROCEDURE — C1717 BRACHYTX, NON-STR,HDR IR-192: HCPCS | Performed by: RADIOLOGY

## 2017-03-03 PROCEDURE — 57156 INS VAG BRACHYTX DEVICE: CPT

## 2017-03-03 PROCEDURE — 77770 HDR RDNCL NTRSTL/ICAV BRCHTX: CPT | Performed by: RADIOLOGY

## 2017-03-03 NOTE — POST-PROCEDURE NOTE
03/03/2017  Tracy Benz    [facility]#   5  Patient presents for cylinder brachytherapy.  She has no complaints of urinary discomfort, diarrhea or vaginal irritation.  The cylinder was inserted and treatment of 6 Gy to the surface of the upper vaginal mucosa was delivered. Patient tolerated procedure well with no complications.  Physics survey was negative with no residual radioactivity.  Discharged to home in good condition.  She will return  for follow up.

## 2017-03-03 NOTE — THERAPY DISCHARGE NOTE
COMPLETION NOTE    PATIENT:   Tracy Benz  :    1960  COMPLETION DATE:   17  DIAGNOSIS:   Endometrial cancer  Pathologic stage: FIGO Stage II (T2, N0, cM0)     Tracy Benz  is a very pleasant 56 y.o. female  who was found to have postmenopausal bleeding that resolved. She underwent hysteroscopy with D&C and intraoperatively had a friable appearing polyp protruding from external cervical os measuring at least 1 cm in diameter. The pathology showed endometrioid adenocarcinoma FIGO grade 1, the polypoid tissue from the lower uterine segment and endocervix showed stromal invasion. She underwent hysterectomy and was found to have a small focus of superficially invasive low grade endometrioid adenocarcinoma with no further cervical or parametrial involvement. 0 of 2 left and 0 of 3 right pelvic nodes were positive for tumor there was 2 mm of 2.5 cm myometrial thickness. She recovered well from surgery and received radiotherapy in our department as follows:    TREATMENT COURSE:   -3/3/2017 the upper vagina received 30Gy in 5 fractions utilizing a vaginal cylinder and [id number]    TOLERANCE:   She tolerated the treatment well with no complaints of vaginal irritation, diarrhea or urinary discomfort.        DISPOSITION:  At the completion of therapy an appointment was made for her to return on 3/23/17 at 11:30 AM.  She knows to call if she has any problems sooner.        Maegan Livingston MD    Errors in dictation may reflect use of voice recognition software and not all errors in transcription may have been detected prior to signing.

## 2017-03-06 RX ORDER — ATORVASTATIN CALCIUM 20 MG/1
TABLET, FILM COATED ORAL
Qty: 30 TABLET | Refills: 0 | Status: SHIPPED | OUTPATIENT
Start: 2017-03-06 | End: 2017-04-12 | Stop reason: SDUPTHER

## 2017-03-06 RX ORDER — METOPROLOL SUCCINATE 25 MG/1
TABLET, EXTENDED RELEASE ORAL
Qty: 45 TABLET | Refills: 0 | Status: SHIPPED | OUTPATIENT
Start: 2017-03-06 | End: 2017-04-12 | Stop reason: SDUPTHER

## 2017-03-17 ENCOUNTER — TELEPHONE (OUTPATIENT)
Dept: RADIATION ONCOLOGY | Facility: HOSPITAL | Age: 57
End: 2017-03-17

## 2017-03-17 RX ORDER — ENALAPRIL MALEATE 5 MG/1
TABLET ORAL
Qty: 30 TABLET | Refills: 0 | Status: SHIPPED | OUTPATIENT
Start: 2017-03-17 | End: 2017-04-12 | Stop reason: SDUPTHER

## 2017-03-17 NOTE — TELEPHONE ENCOUNTER
Pt called with complaints of vaginal itchiness and small clear discharge.  Pt wanted to make sure it was ok to use monistat since having brachytherapy.  Pt stated she is a diabetic and when she eats a lot of sugar she usually develops a yeast infection.  After discussion with Dr. Livingston, patient was ok'd to use monistat.

## 2017-03-23 ENCOUNTER — OFFICE VISIT (OUTPATIENT)
Dept: RADIATION ONCOLOGY | Facility: HOSPITAL | Age: 57
End: 2017-03-23

## 2017-03-23 VITALS
BODY MASS INDEX: 37.82 KG/M2 | DIASTOLIC BLOOD PRESSURE: 93 MMHG | RESPIRATION RATE: 18 BRPM | HEART RATE: 85 BPM | SYSTOLIC BLOOD PRESSURE: 188 MMHG | WEIGHT: 227 LBS | OXYGEN SATURATION: 98 % | HEIGHT: 65 IN | TEMPERATURE: 98.2 F

## 2017-03-23 DIAGNOSIS — C54.1 ENDOMETRIAL NEOPLASM MALIGNANT (HCC): Primary | ICD-10-CM

## 2017-04-01 RX ORDER — METOPROLOL SUCCINATE 25 MG/1
TABLET, EXTENDED RELEASE ORAL
Qty: 45 TABLET | Refills: 0 | Status: CANCELLED | OUTPATIENT
Start: 2017-04-01

## 2017-04-01 RX ORDER — ATORVASTATIN CALCIUM 20 MG/1
TABLET, FILM COATED ORAL
Qty: 30 TABLET | Refills: 0 | Status: CANCELLED | OUTPATIENT
Start: 2017-04-01

## 2017-04-09 RX ORDER — ENALAPRIL MALEATE 5 MG/1
TABLET ORAL
Qty: 30 TABLET | Refills: 0 | Status: CANCELLED | OUTPATIENT
Start: 2017-04-09

## 2017-04-12 ENCOUNTER — OFFICE VISIT (OUTPATIENT)
Dept: FAMILY MEDICINE CLINIC | Facility: CLINIC | Age: 57
End: 2017-04-12

## 2017-04-12 VITALS
HEIGHT: 65 IN | OXYGEN SATURATION: 98 % | BODY MASS INDEX: 37.32 KG/M2 | DIASTOLIC BLOOD PRESSURE: 84 MMHG | SYSTOLIC BLOOD PRESSURE: 128 MMHG | WEIGHT: 224 LBS | HEART RATE: 74 BPM

## 2017-04-12 DIAGNOSIS — I10 ESSENTIAL HYPERTENSION: ICD-10-CM

## 2017-04-12 DIAGNOSIS — E11.9 CONTROLLED TYPE 2 DIABETES MELLITUS WITHOUT COMPLICATION, WITHOUT LONG-TERM CURRENT USE OF INSULIN (HCC): ICD-10-CM

## 2017-04-12 DIAGNOSIS — E78.2 MIXED HYPERLIPIDEMIA: ICD-10-CM

## 2017-04-12 DIAGNOSIS — K21.9 GASTROESOPHAGEAL REFLUX DISEASE WITHOUT ESOPHAGITIS: ICD-10-CM

## 2017-04-12 LAB — HBA1C MFR BLD: 6 %

## 2017-04-12 PROCEDURE — 83036 HEMOGLOBIN GLYCOSYLATED A1C: CPT | Performed by: NURSE PRACTITIONER

## 2017-04-12 PROCEDURE — 99214 OFFICE O/P EST MOD 30 MIN: CPT | Performed by: NURSE PRACTITIONER

## 2017-04-12 RX ORDER — LANSOPRAZOLE 15 MG/1
15 CAPSULE, DELAYED RELEASE ORAL DAILY
Qty: 30 CAPSULE | Refills: 6 | Status: SHIPPED | OUTPATIENT
Start: 2017-04-12

## 2017-04-12 RX ORDER — ATORVASTATIN CALCIUM 20 MG/1
20 TABLET, FILM COATED ORAL DAILY
Qty: 30 TABLET | Refills: 6 | Status: SHIPPED | OUTPATIENT
Start: 2017-04-12

## 2017-04-12 RX ORDER — ENALAPRIL MALEATE 5 MG/1
5 TABLET ORAL DAILY
Qty: 30 TABLET | Refills: 6 | Status: SHIPPED | OUTPATIENT
Start: 2017-04-12

## 2017-04-12 RX ORDER — METOPROLOL SUCCINATE 25 MG/1
TABLET, EXTENDED RELEASE ORAL
Qty: 60 TABLET | Refills: 6 | Status: SHIPPED | OUTPATIENT
Start: 2017-04-12

## 2017-04-12 NOTE — PROGRESS NOTES
"Subjective   Tracy Benz is a 56 y.o. female.     History of Present Illness   Tracy is here today for follow-up for chronic conditions, denies chest pain, shortness of breath, dizziness or edema, she does occasionally have palpitations if BP is elevated. /88 at home last week, does elevate when \"stressed\".  Glucose at home runs in low 100's, denies hypoglycemic symptoms, she did see a [facility] certified diabetes educator.   The following portions of the patient's history were reviewed and updated as appropriate: allergies, current medications, past family history, past medical history, past social history, past surgical history and problem list.    Review of Systems   Constitutional: Negative.    HENT: Negative.    Eyes: Negative.    Respiratory: Negative.    Cardiovascular: Negative.    Gastrointestinal: Negative.    Endocrine: Negative.    Genitourinary: Negative.    Musculoskeletal: Negative.    Skin: Negative.    Allergic/Immunologic: Negative.    Neurological: Negative.    Hematological: Negative.    Psychiatric/Behavioral: Negative.        Objective   Physical Exam   Constitutional: She is oriented to person, place, and time. She appears well-developed and well-nourished.   HENT:   Head: Normocephalic and atraumatic.   Right Ear: External ear normal.   Left Ear: External ear normal.   Mouth/Throat: No oropharyngeal exudate.   Eyes: Conjunctivae and EOM are normal. No scleral icterus.   Neck: Normal range of motion. Neck supple. No thyromegaly present.   Cardiovascular: Normal rate, regular rhythm and normal heart sounds.    No murmur heard.  No bruit   Pulmonary/Chest: Effort normal and breath sounds normal. No respiratory distress.   Musculoskeletal: She exhibits no edema, tenderness or deformity.   Neurological: She is alert and oriented to person, place, and time.   Skin: Skin is warm and dry.   Psychiatric: She has a normal mood and affect. Her behavior is normal. Judgment and thought " content normal.   Nursing note and vitals reviewed.      Assessment/Plan   Tracy was seen today for follow-up.    Diagnoses and all orders for this visit:    Controlled type 2 diabetes mellitus without complication, without long-term current use of insulin  -     POC Glycosylated Hemoglobin (Hb A1C)    Essential hypertension  -     metoprolol succinate XL (TOPROL-XL) 25 MG 24 hr tablet; Take 1-2 PO daily  -     enalapril (VASOTEC) 5 MG tablet; Take 1 tablet by mouth Daily.    Mixed hyperlipidemia  -     atorvastatin (LIPITOR) 20 MG tablet; Take 1 tablet by mouth Daily.    Gastroesophageal reflux disease without esophagitis  -     lansoprazole (PREVACID) 15 MG capsule; Take 1 capsule by mouth Daily.      Results for orders placed or performed in visit on 04/12/17   POC Glycosylated Hemoglobin (Hb A1C)   Result Value Ref Range    Hemoglobin A1C 6.0 %     Diabetes is controlled, continue consistent carb diet, fruits, vegetables, lean meats, fish, high fiber. Recommend exercise with goal of 150 min/week.  Hypertension is controlled, continue current medications, low sodium diet.  Continue Lipitor for hyperlipidemia management, liver enzymes reviewed on labs.  GERD is controlled, continue Prevacid.  Patient was encouraged to keep me informed of any acute changes, lack of improvement, or any new concerning symptoms.  Written by Mercedes MCCORD, APRN student, acting as a scribe for IDANIA Cleary  This note accurately reflects work and decisions made by myself, Margaret PENNY

## 2017-04-14 ENCOUNTER — TELEPHONE (OUTPATIENT)
Dept: FAMILY MEDICINE CLINIC | Facility: CLINIC | Age: 57
End: 2017-04-14

## 2017-04-14 NOTE — TELEPHONE ENCOUNTER
Patient needs a refill on her 1 touch verio flex test strips and lancets but she is not sure how often she is supposed to test

## 2017-07-21 ENCOUNTER — OFFICE VISIT (OUTPATIENT)
Dept: FAMILY MEDICINE CLINIC | Facility: CLINIC | Age: 57
End: 2017-07-21

## 2017-07-21 VITALS
HEART RATE: 87 BPM | SYSTOLIC BLOOD PRESSURE: 116 MMHG | HEIGHT: 65 IN | OXYGEN SATURATION: 98 % | DIASTOLIC BLOOD PRESSURE: 78 MMHG | RESPIRATION RATE: 14 BRPM | WEIGHT: 230 LBS | TEMPERATURE: 98.6 F | BODY MASS INDEX: 38.32 KG/M2

## 2017-07-21 DIAGNOSIS — I10 ESSENTIAL HYPERTENSION: ICD-10-CM

## 2017-07-21 DIAGNOSIS — E78.2 MIXED HYPERLIPIDEMIA: ICD-10-CM

## 2017-07-21 DIAGNOSIS — E11.9 TYPE 2 DIABETES MELLITUS WITHOUT COMPLICATION, UNSPECIFIED LONG TERM INSULIN USE STATUS: Primary | ICD-10-CM

## 2017-07-21 DIAGNOSIS — Z12.39 SCREENING FOR BREAST CANCER: ICD-10-CM

## 2017-07-21 LAB
EXPIRATION DATE: ABNORMAL
HBA1C MFR BLD: 6.5 %
Lab: ABNORMAL

## 2017-07-21 PROCEDURE — 83036 HEMOGLOBIN GLYCOSYLATED A1C: CPT | Performed by: NURSE PRACTITIONER

## 2017-07-21 PROCEDURE — 99214 OFFICE O/P EST MOD 30 MIN: CPT | Performed by: NURSE PRACTITIONER

## 2017-07-21 NOTE — PROGRESS NOTES
Subjective   Tracy Benz is a 56 y.o. female.     History of Present Illness     Ms Benz comes in today for three-month follow-up on her diabetes.  She's had no problems at home with her readings however she reports she has not been eating like she should.  She is feeling well with no real complaints.  She does need an order for mammogram and a breast exam today.  She is status post total hysterectomy 6 months ago and will follow up with her oncology gynecologist next week.  The following portions of the patient's history were reviewed and updated as appropriate: allergies, current medications, past family history, past medical history, past social history, past surgical history and problem list.    Review of Systems   Constitutional: Negative.    Eyes: Negative.    Respiratory: Negative.    Cardiovascular: Negative.    Gastrointestinal: Negative.    Neurological: Negative.        Objective   Physical Exam   Constitutional: She is oriented to person, place, and time. She appears well-developed and well-nourished. No distress.   HENT:   Head: Normocephalic and atraumatic.   Mouth/Throat: Oropharynx is clear and moist.   Eyes: Conjunctivae are normal. No scleral icterus.   Neck: Normal range of motion. Neck supple.   Cardiovascular: Normal rate, regular rhythm, normal heart sounds and intact distal pulses.  Exam reveals no friction rub.    No murmur heard.  No murmurs   Pulmonary/Chest: Effort normal and breath sounds normal. No respiratory distress. She has no wheezes. She has no rales. Right breast exhibits no inverted nipple, no mass, no nipple discharge, no skin change and no tenderness. Left breast exhibits no inverted nipple, no mass, no nipple discharge, no skin change and no tenderness.   Neurological: She is alert and oriented to person, place, and time.   Skin: Skin is warm.   Vitals reviewed.      Assessment/Plan   Tracy was seen today for follow-up.    Diagnoses and all orders for this  visit:    Type 2 diabetes mellitus without complication, unspecified long term insulin use status  -     POC Glycosylated Hemoglobin (Hb A1C)  -     CBC & Differential  -     TSH    Mixed hyperlipidemia  -     Comprehensive Metabolic Panel    Essential hypertension    Screening for breast cancer  -     Mammo screening digital tomosynthesis bilateral w CAD      Hemoglobin A1c is 6.5% today.  She is still at goal however from 6, I do recommend she stick to her diabetic diet.  She will continue her regular chronic medications, we did attempt to draw labs in the office today for an unable to obtain blood therefore I did write her an order for CBC, CMP, TSH and lipid panel.

## 2017-07-28 ENCOUNTER — OFFICE VISIT (OUTPATIENT)
Dept: GYNECOLOGIC ONCOLOGY | Facility: CLINIC | Age: 57
End: 2017-07-28

## 2017-07-28 VITALS
RESPIRATION RATE: 20 BRPM | TEMPERATURE: 97.8 F | DIASTOLIC BLOOD PRESSURE: 80 MMHG | WEIGHT: 226 LBS | HEART RATE: 97 BPM | BODY MASS INDEX: 38.19 KG/M2 | SYSTOLIC BLOOD PRESSURE: 169 MMHG | OXYGEN SATURATION: 98 %

## 2017-07-28 DIAGNOSIS — C54.1 ENDOMETRIAL CANCER (HCC): Primary | ICD-10-CM

## 2017-07-28 PROCEDURE — 99213 OFFICE O/P EST LOW 20 MIN: CPT | Performed by: OBSTETRICS & GYNECOLOGY

## 2017-07-28 NOTE — PROGRESS NOTES
"Subjective     Reason for visit:  Surveillance for endometrial cancer    History of present illness:  The patient is a 57 y.o. 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  She presents today for surveillance visit.    She denies any symptoms today.  She denies vaginal bleeding or discharge.  No abdominal pain.  Admits to normal bowel and bladder function.  Denies nausea and vomiting.  Has normal appetite and energy.  Denies chest pain and shortness of breath.  Denies fevers or chills.  She has been modifying her diet and has lost some weight.  She does note some moodiness.  She states this is been fairly chronic in her lifetime and she is previously taken a variety of SSRIs.  Mild increased sensitivity.  She has been using her vaginal dilator.  Very minimal hot flashes.  Occasional night sweats.    DM2 is stable.  In terms of the type 2 diabetes, pre-op her hemoglobin A1c was 6.4%.  She manages this with diet.  She has hypertension and this is well controlled on several agents.  She recalls having a cardiac catheterization in  for a \"small blockage\".  She is been maintained on low-dose aspirin since then.  She does not follow with cardiology.  Her father  of an MI at the age of 47.  She has arthritis and takes ibuprofen regularly along with Prevacid as a protectant.    Treatment History:  1.  Presented as a referral from Dr. 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  The polypoid tissue from the lower uterine segment and endocervix showed stromal invasion.  2.  Pre-operative CT Abdomen/Pelvis was negative.  On 16 she underwent a RATLH/BSO/limited LND as intra-op frozen section showed no residual cancer  3.  Final pathology showed a small focus of grade 1 endometrioid adenocarcinoma superficially invasive 2 mm of 2.5 cm.  No additional cervical " involvement.  There were 0/5 pelvic lymph nodes sampled.  No LVSI.  Insufficient tissue for MSI testing.  4.  She completed vaginal cuff brachytherapy with Dr. Livingston.  Between 2/22-3/3/2017 the upper vagina received 30Gy in 5 fractions utilizing a vaginal cylinder.    OBGYN History: She is nulliparous.      Past Medical History:   Diagnosis Date   • Anxiety    • Arthritis    • BMI 38.0-38.9,adult    • Diabetes mellitus type 2, diet-controlled    • Endometrial cancer    • Hyperlipemia    • Hypertension    • Palpitations        Past Surgical History:   Procedure Laterality Date   • BREAST BIOPSY     • KNEE ARTHROSCOPY W/ PARTIAL MEDIAL MENISCECTOMY     • LAPAROSCOPIC CHOLECYSTECTOMY  1999   • TONSILLECTOMY     • TOTAL LAPAROSCOPIC HYSTERECTOMY N/A 12/20/2016    RATLH/BSO/Staging       MEDICATIONS: The current medication list was reviewed with the patient and updated in the EMR this date per the Medical Assistant. Medication dosages and frequencies were confirmed to be accurate.      Allergies:  has No Known Allergies.    Social History: She lives approximately 30 minutes away.  She has a cat.  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  She denies use of tobacco, alcohol, and illicit drugs.     Family History:  Her maternal uncle suffered from prostate cancer in his 60s.  Denies a history of breast, colon, endometrial, and ovarian cancer.  No history of melanoma.    Health Maintenance:    1. Mammogram - 7/2017  2. Colonoscopy - upcoming    Review of Systems:  CONSTITUTIONAL:  Weight has been stable, no excessive fatigue.  No fever, chills, night sweats.  PSYCHIATRIC: +anxiety.  No history of depression. No bipolar disorder or insomnia.  EYES: Vision is unchanged.  No photophobia.  EARS, NOSE, MOUTH, AND THROAT: Hearing normal. No swallowing difficulties.  No sore throat.  ENDOCRINE: +DM2.  No history of thyroid disease.  LYMPHATIC: No enlarged lymph nodes  RESPIRATORY: No shortness of breath, cough, asthma or wheezing.  No  hemoptysis.  CARDIOVASCULAR: +palpitations.  No angina, orthopnea, or edema.  +HTN.  No hyperlipidemia.  GASTROINTESTINAL: No constipation, diarrhea, or melena.  No reflux.  No nausea, vomiting.  GENITOURINARY: +MARCELO.  No dysuria, hematuria, urgency, or frequency.  NEUROLOGIC: No numbness, weakness, motor disturbance, syncope, seizure, or headaches.  MUSCULOSKELETAL: +joint pain.  No muscle weakness.  INTEGUMENTARY: No new skin lesions.  GYNECOLOGIC: Per history of present illness.  HEMATOLOGIC: No bleeding problems.  Denies easy bruising.    Objective      Physical Exam  Vitals:    07/28/17 0904   BP: 169/80   Pulse: 97   Resp: 20   Temp: 97.8 °F (36.6 °C)   SpO2: 98%     Body mass index is 38.19 kg/(m^2).    Wt Readings from Last 3 Encounters:   07/28/17 226 lb (103 kg)   07/21/17 230 lb (104 kg)   04/12/17 224 lb (102 kg)     ECOG PS 0    GENERAL: Alert, well-appearing female in no apparent distress.  She appears her stated age.  She is here by herself.  HEENT: Sclera anicteric, normal eyelids. Head normocephalic, atraumatic. Mucus membranes moist.  Normal dentition.    GASTROINTESTINAL:  Soft, non-tender, non-distended, no rebound or guarding, no masses, or hernias.  No HSM.  Incisions - healing laparoscopic incisions  MUSCULOSKELETAL:  Normal gait and station.  FROMx4.  No digital cyanosis.    SKIN:  Warm, dry, well-perfused.  All visible areas intact.  No rashes, lesions, ulcers.  PSYCHIATRIC: AO x3, with appropriate affect, normal thought processes  PELVIC exam: Normal external female genitalia without lesions or skin changes.  Urethra midline.  Vagina without lesions.  Mild radiation related change at the cuff.  Cuff intact, well-healed, and without visible lesions.  On bimanual exam vagina and cuff are smooth and without nodularity.  No palpable abdominal masses. Rectovaginal exam confirmatory. The pelvic sidewalls are smooth, the cul de sac is clear, the rectovaginal septum is supple.      Assessment/Plan   This is a 57 y.o. 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  Endometrial cancer:    -No evidence of disease  -Continue vaginal dilator use at least once a week  -She understands the typical recommendation for intermediate risk endometrial cancer surveillance (stage IB or II) are visits every 3 months for the first year, every 6 months up until 5 years, and annually thereafter.  However with the majority of her tumor features more like stage 1A disease, we can likely follow the less rigorous schedule of visits every 6 months for the first year and annually thereafter.  We discussed need to monitor symptoms including any abdominal pain, bloating, changes in bowel or bladder habits.  She knows to be particularly aware of symptoms that occur several times a week for at least several weeks.  She is to call immediately with any vaginal bleeding or abnormal discharge.      2.  Mood symptoms:  -She will follow-up with her primary care physician.  I gave her written information today regarding Celexa and Wellbutrin and she will review these.    3.  Routine health maintenance:  She is up to date on her mammogram.  She is due for a colonoscopy and will coordinate this with her PCP.  I would like for her to have a clinical breast exam annually.    4.  Follow-up:    -RTC in 6 months for survivorship with APRN  -She knows to call sooner with any questions or concerns.            Electronically Signed by: Luisa Riley MD  Date: 7/28/2017      Time:  10:09 AM    Note: Speech recognition transcription software was used to dictate portions of this document.  An attempt at proofreading has been made though minor errors in transcription may still be present.  Please do not hesitate to call our office with any questions.

## 2017-07-28 NOTE — PATIENT INSTRUCTIONS
Two medications to consider for your symptoms are Wellbutrin or Celexa.    Citalopram tablets  What is this medicine?  CITALOPRAM (sye ANNABEL oh pram) is a medicine for depression.  This medicine may be used for other purposes; ask your health care provider or pharmacist if you have questions.  COMMON BRAND NAME(S): Celexa  What should I tell my health care provider before I take this medicine?  They need to know if you have any of these conditions:  -bleeding disorders  -bipolar disorder or a family history of bipolar disorder  -glaucoma  -heart disease  -history of irregular heartbeat  -kidney disease  -liver disease  -low levels of magnesium or potassium in the blood  -receiving electroconvulsive therapy  -seizures  -suicidal thoughts, plans, or attempt; a previous suicide attempt by you or a family member  -take medicines that treat or prevent blood clots  -thyroid disease  -an unusual or allergic reaction to citalopram, escitalopram, other medicines, foods, dyes, or preservatives  -pregnant or trying to become pregnant  -breast-feeding  How should I use this medicine?  Take this medicine by mouth with a glass of water. Follow the directions on the prescription label. You can take it with or without food. Take your medicine at regular intervals. Do not take your medicine more often than directed. Do not stop taking this medicine suddenly except upon the advice of your doctor. Stopping this medicine too quickly may cause serious side effects or your condition may worsen.  A special MedGuide will be given to you by the pharmacist with each prescription and refill. Be sure to read this information carefully each time.  Talk to your pediatrician regarding the use of this medicine in children. Special care may be needed.  Patients over 60 years old may have a stronger reaction and need a smaller dose.  Overdosage: If you think you have taken too much of this medicine contact a poison control center or emergency room at  once.  NOTE: This medicine is only for you. Do not share this medicine with others.  What if I miss a dose?  If you miss a dose, take it as soon as you can. If it is almost time for your next dose, take only that dose. Do not take double or extra doses.  What may interact with this medicine?  Do not take this medicine with any of the following medications:  -certain medicines for fungal infections like fluconazole, itraconazole, ketoconazole, posaconazole, voriconazole  -cisapride  -dofetilide  -dronedarone  -escitalopram  -linezolid  -MAOIs like Carbex, Eldepryl, Marplan, Nardil, and Parnate  -methylene blue (injected into a vein)  -pimozide  -thioridazine  -ziprasidone  This medicine may also interact with the following medications:  -alcohol  -amphetamines  -aspirin and aspirin-like medicines  -carbamazepine  -certain medicines for depression, anxiety, or psychotic disturbances  -certain medicines for infections like chloroquine, clarithromycin, erythromycin, furazolidone, isoniazid, pentamidine  -certain medicines for migraine headaches like almotriptan, eletriptan, frovatriptan, naratriptan, rizatriptan, sumatriptan, zolmitriptan  -certain medicines for sleep  -certain medicines that treat or prevent blood clots like dalteparin, enoxaparin, warfarin  -cimetidine  -diuretics  -fentanyl  -lithium  -methadone  -metoprolol  -NSAIDs, medicines for pain and inflammation, like ibuprofen or naproxen  -omeprazole  -other medicines that prolong the QT interval (cause an abnormal heart rhythm)  -procarbazine  -rasagiline  -supplements like [place]'s wort, kava kava, valerian  -tramadol  -tryptophan  This list may not describe all possible interactions. Give your health care provider a list of all the medicines, herbs, non-prescription drugs, or dietary supplements you use. Also tell them if you smoke, drink alcohol, or use illegal drugs. Some items may interact with your medicine.  What should I watch for while using  this medicine?  Tell your doctor if your symptoms do not get better or if they get worse. Visit your doctor or health care professional for regular checks on your progress. Because it may take several weeks to see the full effects of this medicine, it is important to continue your treatment as prescribed by your doctor.  Patients and their families should watch out for new or worsening thoughts of suicide or depression. Also watch out for sudden changes in feelings such as feeling anxious, agitated, panicky, irritable, hostile, aggressive, impulsive, severely restless, overly excited and hyperactive, or not being able to sleep. If this happens, especially at the beginning of treatment or after a change in dose, call your health care professional.  You may get drowsy or dizzy. Do not drive, use machinery, or do anything that needs mental alertness until you know how this medicine affects you. Do not stand or sit up quickly, especially if you are an older patient. This reduces the risk of dizzy or fainting spells. Alcohol may interfere with the effect of this medicine. Avoid alcoholic drinks.  Your mouth may get dry. Chewing sugarless gum or sucking hard candy, and drinking plenty of water will help. Contact your doctor if the problem does not go away or is severe.  What side effects may I notice from receiving this medicine?  Side effects that you should report to your doctor or health care professional as soon as possible:  -allergic reactions like skin rash, itching or hives, swelling of the face, lips, or tongue  -black, tarry stools  -breathing problems  -changes in vision  -chest pain  -confusion  -eye pain  -fast, irregular heartbeat  -feeling faint or lightheaded, falls  -hallucination, loss of contact with reality  -loss of balance or coordination  -loss of memory  -restlessness, pacing, inability to keep still  -seizures  -stiff muscles  -suicidal thoughts or other mood changes  -trouble sleeping  -unusual  bleeding or bruising  -unusually weak or tired  -vomiting  Side effects that usually do not require medical attention (report to your doctor or health care professional if they continue or are bothersome):  -change in appetite or weight  -change in sex drive or performance  -dizziness  -headache  -increased sweating  -indigestion, nausea  -tremor  This list may not describe all possible side effects. Call your doctor for medical advice about side effects. You may report side effects to FDA at 2-445-FDA-9242.  Where should I keep my medicine?  Keep out of reach of children.  Store at room temperature between 15 and 30 degrees C (59 and 86 degrees F). Throw away any unused medicine after the expiration date.  NOTE: This sheet is a summary. It may not cover all possible information. If you have questions about this medicine, talk to your doctor, pharmacist, or health care provider.     © 2017, [employer]. (2017-02-07 11:53:44)  Bupropion sustained-release tablets (Depression/Mood Disorders)  What is this medicine?  BUPROPION (byoo PROE pee on) is used to treat depression.  This medicine may be used for other purposes; ask your health care provider or pharmacist if you have questions.  COMMON BRAND NAME(S): Budeprion SR, Wellbutrin SR  What should I tell my health care provider before I take this medicine?  They need to know if you have any of these conditions:  -an eating disorder, such as anorexia or bulimia  -bipolar disorder or psychosis  -diabetes or high blood sugar, treated with medication  -glaucoma  -head injury or brain tumor  -heart disease, previous heart attack, or irregular heart beat  -high blood pressure  -kidney or liver disease  -seizures  -suicidal thoughts or a previous suicide attempt  -Tourette's syndrome  -weight loss  -an unusual or allergic reaction to bupropion, other medicines, foods, dyes, or preservatives  -breast-feeding  -pregnant or trying to become pregnant  How should I use  this medicine?  Take this medicine by mouth with a glass of water. Follow the directions on the prescription label. You can take it with or without food. If it upsets your stomach, take it with food. Do not cut, crush or chew this medicine. Take your medicine at regular intervals. If you take this medicine more than once a day, take your second dose at least 8 hours after you take your first dose. To limit difficulty in sleeping, avoid taking this medicine at bedtime. Do not take your medicine more often than directed. Do not stop taking this medicine suddenly except upon the advice of your doctor. Stopping this medicine too quickly may cause serious side effects or your condition may worsen.  A special MedGuide will be given to you by the pharmacist with each prescription and refill. Be sure to read this information carefully each time.  Talk to your pediatrician regarding the use of this medicine in children. Special care may be needed.  Overdosage: If you think you have taken too much of this medicine contact a poison control center or emergency room at once.  NOTE: This medicine is only for you. Do not share this medicine with others.  What if I miss a dose?  If you miss a dose, skip the missed dose and take your next tablet at the regular time. There should be at least 8 hours between doses. Do not take double or extra doses.  What may interact with this medicine?  Do not take this medicine with any of the following medications:  -linezolid  -MAOIs like Azilect, Carbex, Eldepryl, Marplan, Nardil, and Parnate  -methylene blue (injected into a vein)  -other medicines that contain bupropion like Zyban  This medicine may also interact with the following medications:  -alcohol  -certain medicines for anxiety or sleep  -certain medicines for blood pressure like metoprolol, propranolol  -certain medicines for depression or psychotic disturbances  -certain medicines for HIV or AIDS like efavirenz, lopinavir,  nelfinavir, ritonavir  -certain medicines for irregular heart beat like propafenone, flecainide  -certain medicines for Parkinson's disease like amantadine, levodopa  -certain medicines for seizures like carbamazepine, phenytoin, phenobarbital  -cimetidine  -clopidogrel  -cyclophosphamide  -furazolidone  -isoniazid  -nicotine  -orphenadrine  -procarbazine  -steroid medicines like prednisone or cortisone  -stimulant medicines for attention disorders, weight loss, or to stay awake  -tamoxifen  -theophylline  -thiotepa  -ticlopidine  -tramadol  -warfarin  This list may not describe all possible interactions. Give your health care provider a list of all the medicines, herbs, non-prescription drugs, or dietary supplements you use. Also tell them if you smoke, drink alcohol, or use illegal drugs. Some items may interact with your medicine.  What should I watch for while using this medicine?  Tell your doctor if your symptoms do not get better or if they get worse. Visit your doctor or health care professional for regular checks on your progress. Because it may take several weeks to see the full effects of this medicine, it is important to continue your treatment as prescribed by your doctor.  Patients and their families should watch out for new or worsening thoughts of suicide or depression. Also watch out for sudden changes in feelings such as feeling anxious, agitated, panicky, irritable, hostile, aggressive, impulsive, severely restless, overly excited and hyperactive, or not being able to sleep. If this happens, especially at the beginning of treatment or after a change in dose, call your health care professional.  Avoid alcoholic drinks while taking this medicine. Drinking excessive alcoholic beverages, using sleeping or anxiety medicines, or quickly stopping the use of these agents while taking this medicine may increase your risk for a seizure.  Do not drive or use heavy machinery until you know how this medicine  affects you. This medicine can impair your ability to perform these tasks.  Do not take this medicine close to bedtime. It may prevent you from sleeping.  Your mouth may get dry. Chewing sugarless gum or sucking hard candy, and drinking plenty of water may help. Contact your doctor if the problem does not go away or is severe.  What side effects may I notice from receiving this medicine?  Side effects that you should report to your doctor or health care professional as soon as possible:  -allergic reactions like skin rash, itching or hives, swelling of the face, lips, or tongue  -breathing problems  -changes in vision  -confusion  -fast or irregular heartbeat  -hallucinations  -increased blood pressure  -redness, blistering, peeling or loosening of the skin, including inside the mouth  -seizures  -suicidal thoughts or other mood changes  -unusually weak or tired  -vomiting  Side effects that usually do not require medical attention (report to your doctor or health care professional if they continue or are bothersome):  -change in sex drive or performance  -constipation  -headache  -loss of appetite  -nausea  -tremors  -weight loss  This list may not describe all possible side effects. Call your doctor for medical advice about side effects. You may report side effects to FDA at 3-670-FDA-0740.  Where should I keep my medicine?  Keep out of the reach of children.  Store at room temperature between 20 and 25 degrees C (68 and 77 degrees F), away from direct sunlight and moisture. Keep tightly closed. Throw away any unused medicine after the expiration date.  NOTE: This sheet is a summary. It may not cover all possible information. If you have questions about this medicine, talk to your doctor, pharmacist, or health care provider.     © 2017, [employer]. (2014-07-11 12:41:10)

## 2017-08-04 ENCOUNTER — TELEPHONE (OUTPATIENT)
Dept: FAMILY MEDICINE CLINIC | Facility: CLINIC | Age: 57
End: 2017-08-04

## 2017-08-04 NOTE — TELEPHONE ENCOUNTER
Tracy notified and we have written an order for her to go repeat the alk phos isoenzymes at the hospital.

## 2017-08-04 NOTE — TELEPHONE ENCOUNTER
----- Message from IDANIA Mota sent at 8/4/2017  6:47 AM EDT -----  Please let her know her triglycerides are up a bit, recommend she eat heart healthy diet, more whole grains and increase exercise.    I would like her to come in for repeat alk phos with isoenzymes due to mild elevation in alkaline phos level on blood

## 2017-10-13 RX ORDER — BLOOD SUGAR DIAGNOSTIC
STRIP MISCELLANEOUS
Refills: 0 | OUTPATIENT
Start: 2017-10-13

## 2017-10-23 ENCOUNTER — OFFICE VISIT (OUTPATIENT)
Dept: RADIATION ONCOLOGY | Facility: HOSPITAL | Age: 57
End: 2017-10-23

## 2017-10-23 ENCOUNTER — HOSPITAL ENCOUNTER (OUTPATIENT)
Dept: RADIATION ONCOLOGY | Facility: HOSPITAL | Age: 57
Setting detail: RADIATION/ONCOLOGY SERIES
Discharge: HOME | End: 2017-10-23

## 2017-10-23 VITALS
TEMPERATURE: 97.8 F | HEART RATE: 85 BPM | HEIGHT: 65 IN | RESPIRATION RATE: 18 BRPM | BODY MASS INDEX: 38.69 KG/M2 | DIASTOLIC BLOOD PRESSURE: 89 MMHG | SYSTOLIC BLOOD PRESSURE: 173 MMHG | WEIGHT: 232.2 LBS

## 2017-10-23 DIAGNOSIS — C54.1 ENDOMETRIAL NEOPLASM MALIGNANT (HCC): Primary | ICD-10-CM

## 2017-10-23 PROCEDURE — G0463 HOSPITAL OUTPT CLINIC VISIT: HCPCS

## 2017-10-23 NOTE — PROGRESS NOTES
FOLLOW UP NOTE    PATIENT:                                                      Tracy Benz  MEDICAL RECORD #:                        [id number]  :                                                          1960  COMPLETION DATE:   3/3/2017  DIAGNOSIS:     Endometrial cancer    Staging form: Corpus Uteri - Carcinoma, AJCC V7    - Clinical stage from 2016: FIGO Stage II (T2, N0, M0)     - Pathologic stage from 2016: FIGO Stage II (T2, N0, cM0)     BRIEF HISTORY:    Tracy Benz  is a very pleasant 56 y.o. female  who was found to have postmenopausal bleeding that resolved. She underwent hysteroscopy with D&C and intraoperatively had a friable appearing polyp protruding from external cervical os measuring at least 1 cm in diameter. The pathology showed endometrioid adenocarcinoma FIGO grade 1, the polypoid tissue from the lower uterine segment and endocervix showed stromal invasion. She underwent hysterectomy and was found to have a small focus of superficially invasive low grade endometrioid adenocarcinoma with no further cervical or parametrial involvement. 0 of 2 left and 0 of 3 right pelvic nodes were positive for tumor there was 2 mm of 2.5 cm myometrial thickness. She recovered well from surgery and received radiotherapy in our department of:  the upper vagina received 30Gy in 5 fractions utilizing a vaginal cylinder and Iridium-192. She tolerated the treatment well with no complaints of vaginal irritation, diarrhea or urinary discomfort and has no complaints today.  She used the dilator for a while and stopped.      MEDICATIONS: Medication reconciliation for the patient was reviewed and confirmed in the electronic medical record.    Review of Systems   All other systems reviewed and are negative.      %    Physical Exam   Constitutional: She appears well-developed and well-nourished.   Neck: Neck supple.   Cardiovascular: Normal rate, regular rhythm and normal heart  "sounds.    Pulmonary/Chest: Effort normal and breath sounds normal.   Abdominal: Soft.   Genitourinary: Vagina normal.   Genitourinary Comments: Expected telangectasia at vaginal apex.     Nursing note and vitals reviewed.  Rectal exam negative.    VITAL SIGNS:   Vitals:    10/23/17 0925   BP: 173/89   Pulse: 85   Resp: 18   Temp: 97.8 °F (36.6 °C)   Weight: 232 lb 3.2 oz (105 kg)   Height: 64.5\" (163.8 cm)   PainSc: 0-No pain       The following portions of the patient's history were reviewed and updated as appropriate: allergies, current medications, past family history, past medical history, past social history, past surgical history and problem list.            IMPRESSION: No evidence of recurrent or metastatic disease     RECOMMENDATIONS:  Mrs. Benz is doing well.  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  I explained to her that this could bleed after intercourse or after using dilator and not to be concerned.  She has follow-up with Dr. Riley's office in March and we will see her back in our department as needed.           Maegan Livingston MD    Errors in dictation may reflect use of voice recognition software and not all errors in transcription may have been detected prior to signing.  "

## 2018-01-18 ENCOUNTER — CLINICAL SUPPORT (OUTPATIENT)
Dept: GYNECOLOGIC ONCOLOGY | Facility: CLINIC | Age: 58
End: 2018-01-18

## 2018-01-18 VITALS
DIASTOLIC BLOOD PRESSURE: 91 MMHG | SYSTOLIC BLOOD PRESSURE: 144 MMHG | TEMPERATURE: 98.7 F | BODY MASS INDEX: 38.53 KG/M2 | HEART RATE: 110 BPM | WEIGHT: 228 LBS | OXYGEN SATURATION: 97 % | RESPIRATION RATE: 14 BRPM

## 2018-01-18 DIAGNOSIS — C54.1 ENDOMETRIAL CANCER (HCC): Primary | ICD-10-CM

## 2018-01-18 PROCEDURE — 99214 OFFICE O/P EST MOD 30 MIN: CPT | Performed by: NURSE PRACTITIONER

## 2018-01-18 RX ORDER — IBUPROFEN 200 MG
800 TABLET ORAL DAILY
COMMUNITY
End: 2020-03-02 | Stop reason: ALTCHOICE

## 2018-01-18 NOTE — PROGRESS NOTES
GYN ONCOLOGY CANCER SURVIVORSHIP VISIT    Tracy Benz  [id number]  1960    Chief Complaint: Follow-up (survivorship, brown d/c)        History of present illness:  Tracy Benz is a 57 y.o. year old female who is here today for her Cancer Survivorship visit, see oncology history below. She reports 1 occurrence of brown spotting onto her pad last week. This occurred a few hours after having a firm BM, h/o constipation. This worried her and she used her vaginal dilator to see if there was any more blood coming from the vagina. She did not see anything else and no spotting has recurred. She denies abnormal discharge, vaginal odor, pelvic pain, or changes in bladder function. She has started Weight Watchers this week, is increasing her fiber intake, and has noticed an improvement in her constipation.   Otherwise, she is feeling very well. She has been overall pleased with her care and her outcomes.      Cancer History:      Endometrial cancer    11/30/2016 Initial Diagnosis     Hysteroscopy D&C with MyoSure and cervical polypectomy with Dr. Tilley for AUB. Pathology revealed grade 1 endometrioid adenocarcinoma and polypoid tissue from the lower uterine segment and endocervix showed stromal invasion.         12/9/2016 Imaging     Pre-OP CT negative for evidence of metastasis         12/20/2016 Surgery     RTLH/BSO with limited lymph node dissection. Frozen section showed no residual cancer. Final pathology revealed small focus of grade 1 endometrioid adenocarcinoma superficially invasive (2mm / 2.5 cm). There was no additional cervical involvement, all sampled nodes were negative, no LVSI, and tissue insufficient for MSI testing. Final Stage II         2/22/2017 - 3/3/2017 Radiation     Brachytherapy with Dr. Livingston. The upper vagina received 30 Gy in 5 fractions utilizing a vaginal cylinder.             Past Medical History:   Diagnosis Date   • Anxiety    • Arthritis    • BMI 38.0-38.9,adult    •  Diabetes mellitus type 2, diet-controlled    • Endometrial cancer    • Hyperlipemia    • Hypertension    • Palpitations        Past Surgical History:   Procedure Laterality Date   • BREAST BIOPSY     • KNEE ARTHROSCOPY W/ PARTIAL MEDIAL MENISCECTOMY     • LAPAROSCOPIC CHOLECYSTECTOMY  1999   • TONSILLECTOMY     • TOTAL LAPAROSCOPIC HYSTERECTOMY N/A 12/20/2016    RATLH/BSO/Staging       MEDICATIONS: The current medication list was reviewed and reconciled.     Allergies:  has No Known Allergies.    Family History   Problem Relation Age of Onset   • Hypertension Mother    • Diabetes type II Mother    • Heart failure Mother    • Diabetes type II Father    • Heart attack Father    • Prostate cancer Maternal Uncle 60       Last imaging study was pre-op CT 12/15/2016.   Tumor marker:  Lab Results   Component Value Date     14.0 12/19/2016       Review of Systems   Constitutional: Negative for appetite change, chills, fatigue, fever and unexpected weight change.   Respiratory: Negative for cough, shortness of breath and wheezing.    Cardiovascular: Negative for chest pain, palpitations and leg swelling.   Gastrointestinal: Negative for abdominal distention, abdominal pain, blood in stool, constipation, diarrhea, nausea and vomiting.   Endocrine: Negative.    Genitourinary: Positive for vaginal bleeding (dark brown spotting x 1 occurence). Negative for dyspareunia, dysuria, frequency, genital sores, hematuria, pelvic pain, urgency, vaginal discharge and vaginal pain.   Musculoskeletal: Negative for arthralgias, gait problem and joint swelling.   Neurological: Negative for dizziness, seizures, syncope, weakness, light-headedness, numbness and headaches.   Hematological: Negative for adenopathy.   Psychiatric/Behavioral: Negative.        Physical Exam  Vital Signs: /91  Pulse 110  Temp 98.7 °F (37.1 °C) (Temporal Artery )   Resp 14  Wt 103 kg (228 lb)  LMP 06/03/2016 (Approximate)  SpO2 97%  BMI 38.53 kg/m2    General Appearance:  alert, cooperative, no apparent distress, appears stated age and obese   Neurologic/Psychiatric: A&O x 3, gait steady, appropriate affect   HEENT:  Normocephalic, without obvious abnormality, mucous membranes moist   Neck: Supple, symmetrical, trachea midline, no adenopathy;  No thyromegaly, masses, or tenderness   Back:   Symmetric, no curvature, ROM normal, no CVA tenderness   Lungs:   Clear to auscultation bilaterally; respirations regular, even, and unlabored bilaterally   Heart:  Regular rate and rhythm, no murmurs appreciated   Breasts:  deferred   Abdomen:   Soft, non-tender, non-distended, no organomegaly and Exam limited d/t habitus.   Lymph nodes: No cervical, supraclavicular, inguinal or axillary adenopathy noted   Extremities: Normal, atraumatic; no clubbing, cyanosis, or edema    Pelvic: External Genitalia  without lesions or skin changes  Vagina  is pink, moist, without lesions.   Vaginal Cuff  Female Vaginal Cuff: smooth, intact, without visible lesions, changes consistent with previous radiation and mild friability noted without lesions  Uterus  surgically absent and no palpable masses  Ovaries  surgically absent bilaterallly  Parametria  smooth  Rectovaginal  Female rectovaginal: confirms no masses or bleeding and Hemoccult negative     ECOG Performance Status: 0 - Asymptomatic    Procedure Note:  No notes on file      Assessment and Plan:  Tracy was seen today for follow-up.    Diagnoses and all orders for this visit:    Endometrial cancer        Tracy and I discussed her occurrence of spotting last week. There are no concerning lesions and KELLY upon exam today. This was likely d/t friable radiated tissues at the vaginal cuff. She should expect some mild spotting after exam today. I encouraged regular use of vaginal dilator to maintain elasticity of tissues. She is encouraged to call for any heavy or spontaneous bleeding. Encouraged to keep bowels soft.     The patient and I  have reviewed her Survivorship Care Plan in detail. We discussed her diagnosis, pathology, histology, all treatments, and ongoing surveillance recommendations. All questions were answered to her satisfaction. She is in agreement with our plan for ongoing surveillance as outlined in her plan. A copy of this document was provided to her at the completion of our visit.  She is working to find a new PCP as her previous office has closed. She will notify us of her new provider soon and a copy of her plan can be sent for their records.     This was a 25 minute visit with 15 minutes spent in direct face to face review of her Survivorship Care Plan.    Return in about 6 months (around 7/18/2018) for ongoing cancer surveillance.      Kat Phipps, IDANIA        Note: Speech recognition transcription software was used to dictate portions of this document.  An attempt at proofreading has been made though minor errors in transcription may still be present.  Please do not hesitate to call our office with any questions.

## 2018-08-24 ENCOUNTER — OFFICE VISIT (OUTPATIENT)
Dept: GYNECOLOGIC ONCOLOGY | Facility: CLINIC | Age: 58
End: 2018-08-24

## 2018-08-24 VITALS
DIASTOLIC BLOOD PRESSURE: 82 MMHG | BODY MASS INDEX: 37.86 KG/M2 | HEART RATE: 80 BPM | TEMPERATURE: 97.7 F | RESPIRATION RATE: 16 BRPM | WEIGHT: 224 LBS | SYSTOLIC BLOOD PRESSURE: 145 MMHG | OXYGEN SATURATION: 98 %

## 2018-08-24 DIAGNOSIS — C54.1 ENDOMETRIAL CANCER (HCC): Primary | ICD-10-CM

## 2018-08-24 PROCEDURE — 99213 OFFICE O/P EST LOW 20 MIN: CPT | Performed by: NURSE PRACTITIONER

## 2018-08-24 NOTE — PROGRESS NOTES
GYN ONCOLOGY CANCER SURVEILLANCE FOLLOW-UP    Tracy Benz  [id number]  1960    Chief Complaint: Follow-up (no complaints )        History of present illness:  Tracy Benz is a 58 y.o. year old female who is here today for ongoing surveillance of Endometrial Cancer, see Cancer History. She reports she is feeling very well today and has no complaints. She denies vaginal bleeding, pelvic pain, and changes in bowel or bladder function. All well woman screenings are currently UTD.           Cancer History:      Endometrial cancer (CMS/HCC)    11/30/2016 Initial Diagnosis     Hysteroscopy D&C with MyoSure and cervical polypectomy with Dr. Tilley for AUB. Pathology revealed grade 1 endometrioid adenocarcinoma and polypoid tissue from the lower uterine segment and endocervix showed stromal invasion.         12/9/2016 Imaging     Pre-OP CT negative for evidence of metastasis         12/20/2016 Surgery     RTLH/BSO with limited lymph node dissection. Frozen section showed no residual cancer. Final pathology revealed small focus of grade 1 endometrioid adenocarcinoma superficially invasive (2mm / 2.5 cm). There was no additional cervical involvement, all sampled nodes were negative, no LVSI, and tissue insufficient for MSI testing. Final Stage II         2/22/2017 - 3/3/2017 Radiation     Brachytherapy with Dr. Livingston. The upper vagina received 30 Gy in 5 fractions utilizing a vaginal cylinder.          1/18/2018 Survivorship     Survivorship Care Plan completed and discussed with patient.  Copy of Survivorship Care Plan provided to patient and primary care provider.             Past Medical History:   Diagnosis Date   • Anxiety    • Arthritis    • BMI 38.0-38.9,adult    • Diabetes mellitus type 2, diet-controlled (CMS/HCC)    • Endometrial cancer (CMS/HCC)    • Hyperlipemia    • Hypertension    • Palpitations        Past Surgical History:   Procedure Laterality Date   • BREAST BIOPSY     • KNEE ARTHROSCOPY  W/ PARTIAL MEDIAL MENISCECTOMY     • LAPAROSCOPIC CHOLECYSTECTOMY  1999   • TONSILLECTOMY     • TOTAL LAPAROSCOPIC HYSTERECTOMY N/A 12/20/2016    RATLH/BSO/Staging       MEDICATIONS: The current medication list was reviewed and reconciled.     Allergies:  has No Known Allergies.    Family History   Problem Relation Age of Onset   • Hypertension Mother    • Diabetes type II Mother    • Heart failure Mother    • Diabetes type II Father    • Heart attack Father    • Prostate cancer Maternal Uncle 60       Last imaging study was pre-op CT 12/15/2016.   Tumor marker:     Date Value Ref Range Status   12/19/2016 14.0 0.0 - 30.2 U/mL Final       Review of Systems   Constitutional: Negative for appetite change, chills, fatigue, fever and unexpected weight change.   Respiratory: Negative for cough, shortness of breath and wheezing.    Cardiovascular: Negative for chest pain, palpitations and leg swelling.   Gastrointestinal: Negative for abdominal distention, abdominal pain, blood in stool, constipation, diarrhea, nausea and vomiting.   Endocrine: Negative.    Genitourinary: Negative for dyspareunia, dysuria, frequency, genital sores, hematuria, pelvic pain, urgency, vaginal bleeding, vaginal discharge and vaginal pain.   Musculoskeletal: Negative for arthralgias, gait problem and joint swelling.   Neurological: Negative for dizziness, seizures, syncope, weakness, light-headedness, numbness and headaches.   Hematological: Negative for adenopathy.   Psychiatric/Behavioral: Negative.        Physical Exam  Vital Signs: /82   Pulse 80   Temp 97.7 °F (36.5 °C) (Temporal Artery )   Resp 16   Wt 102 kg (224 lb)   LMP 06/03/2016 (Approximate)   SpO2 98%   BMI 37.86 kg/m²    General Appearance:  alert, cooperative, no apparent distress and appears stated age   Neurologic/Psychiatric: A&O x 3, gait steady, appropriate affect   HEENT:  Normocephalic, without obvious abnormality, mucous membranes moist   Neck: Supple,  symmetrical, trachea midline, no adenopathy;  No thyromegaly, masses, or tenderness   Back:   Symmetric, no curvature, ROM normal, no CVA tenderness   Lungs:   Clear to auscultation bilaterally; respirations regular, even, and unlabored bilaterally   Heart:  Regular rate and rhythm, no murmurs appreciated   Breasts:  deferred   Abdomen:   Soft, non-tender, non-distended and no organomegaly   Lymph nodes: No cervical, supraclavicular, inguinal or axillary adenopathy noted   Extremities: Normal, atraumatic; no clubbing, cyanosis, or edema    Pelvic: External Genitalia  without lesions or skin changes  Vagina  is pink, moist, without lesions.   Vaginal Cuff  Female Vaginal Cuff: smooth, intact and without visible lesions  Uterus  surgically absent and no palpable masses  Ovaries  surgically absent bilaterallly  Parametria  smooth  Rectovaginal  Female rectovaginal: deferred     ECOG Performance Status: 0 - Asymptomatic    Procedure Note:  No notes on file      Assessment and Plan:  Tracy was seen today for follow-up.    Diagnoses and all orders for this visit:    Endometrial cancer (CMS/HCC)        There is no evidence of disease upon today's exam. We will plan for 1 additional 6 month visit to reach the 2 year kia, then if doing well she may go to annual visits per Dr. Riley's original surveillance plan. She is understanding to call with any changes in pelvic symptoms or general GYN concerns at any time between regularly scheduled visits.     Return to clinic in 6 months for ongoing cancer surveillance.      IDANIA Bustos        Note: Speech recognition transcription software was used to dictate portions of this document.  An attempt at proofreading has been made though minor errors in transcription may still be present.  Please do not hesitate to call our office with any questions.

## 2019-01-24 ENCOUNTER — OFFICE VISIT (OUTPATIENT)
Dept: GYNECOLOGIC ONCOLOGY | Facility: CLINIC | Age: 59
End: 2019-01-24

## 2019-01-24 VITALS
OXYGEN SATURATION: 96 % | WEIGHT: 227 LBS | SYSTOLIC BLOOD PRESSURE: 187 MMHG | HEART RATE: 103 BPM | DIASTOLIC BLOOD PRESSURE: 86 MMHG | RESPIRATION RATE: 16 BRPM | BODY MASS INDEX: 38.36 KG/M2 | TEMPERATURE: 98.5 F

## 2019-01-24 DIAGNOSIS — N89.8 VAGINAL DISCHARGE: Primary | ICD-10-CM

## 2019-01-24 DIAGNOSIS — C54.1 ENDOMETRIAL CANCER (HCC): ICD-10-CM

## 2019-01-24 DIAGNOSIS — R14.0 BLOATING: ICD-10-CM

## 2019-01-24 PROCEDURE — 87591 N.GONORRHOEAE DNA AMP PROB: CPT | Performed by: NURSE PRACTITIONER

## 2019-01-24 PROCEDURE — 87798 DETECT AGENT NOS DNA AMP: CPT | Performed by: NURSE PRACTITIONER

## 2019-01-24 PROCEDURE — 87491 CHLMYD TRACH DNA AMP PROBE: CPT | Performed by: NURSE PRACTITIONER

## 2019-01-24 PROCEDURE — 87801 DETECT AGNT MULT DNA AMPLI: CPT | Performed by: NURSE PRACTITIONER

## 2019-01-24 PROCEDURE — 99213 OFFICE O/P EST LOW 20 MIN: CPT | Performed by: NURSE PRACTITIONER

## 2019-01-24 PROCEDURE — 87661 TRICHOMONAS VAGINALIS AMPLIF: CPT | Performed by: NURSE PRACTITIONER

## 2019-01-30 ENCOUNTER — TELEPHONE (OUTPATIENT)
Dept: GYNECOLOGIC ONCOLOGY | Facility: CLINIC | Age: 59
End: 2019-01-30

## 2019-01-30 LAB
A VAGINAE DNA VAG QL NAA+PROBE: NORMAL SCORE
BVAB2 DNA VAG QL NAA+PROBE: NORMAL SCORE
C ALBICANS DNA VAG QL NAA+PROBE: NEGATIVE
C GLABRATA DNA VAG QL NAA+PROBE: NEGATIVE
C TRACH RRNA SPEC DONR QL NAA+PROBE: NEGATIVE
MEGASPHAERA 1: NORMAL SCORE
N GONORRHOEA DNA SPEC QL NAA+PROBE: NEGATIVE
T VAGINALIS RRNA GENITAL QL PROBE: NEGATIVE

## 2019-01-30 NOTE — TELEPHONE ENCOUNTER
Called patient to notify NuSwab negative. She states discharge has improved since her visit and bloating is improved with watching her diet. I encouraged daily probiotic for bowel regularity and vaginal health benefits. She will keep 3/1/2019 appointment as scheduled and notify me of any acute changes in symptoms before then.

## 2019-03-01 ENCOUNTER — OFFICE VISIT (OUTPATIENT)
Dept: GYNECOLOGIC ONCOLOGY | Facility: CLINIC | Age: 59
End: 2019-03-01

## 2019-03-01 VITALS
SYSTOLIC BLOOD PRESSURE: 154 MMHG | RESPIRATION RATE: 12 BRPM | OXYGEN SATURATION: 98 % | HEART RATE: 83 BPM | DIASTOLIC BLOOD PRESSURE: 76 MMHG | BODY MASS INDEX: 39.38 KG/M2 | TEMPERATURE: 97.9 F | WEIGHT: 233 LBS

## 2019-03-01 DIAGNOSIS — C54.1 ENDOMETRIAL CANCER (HCC): Primary | ICD-10-CM

## 2019-03-01 PROCEDURE — 99213 OFFICE O/P EST LOW 20 MIN: CPT | Performed by: NURSE PRACTITIONER

## 2019-03-01 NOTE — PROGRESS NOTES
GYN ONCOLOGY CANCER SURVEILLANCE FOLLOW-UP    Tracy Benz  [id number]  1960    Chief Complaint: Follow-up (no complaints)        History of present illness:  Tracy Benz is a 58 y.o. year old female who is here today for ongoing surveillance of Endometrial Cancer, see Cancer History. She reports she is feeling very well today and has no complaints. She denies vaginal bleeding, pelvic pain, and changes in bowel or bladder function. She states the bloating and yellow vaginal discharge she was having a month ago has subsided. She is now celebrating 2 years from completion of treatement. She will be doing some shopping after her visit today, looking for new luggage as she travels often for work.     Cancer History:      Endometrial cancer (CMS/HCC)    11/30/2016 Initial Diagnosis     Hysteroscopy D&C with MyoSure and cervical polypectomy with Dr. Tilley for [facility]. Pathology revealed grade 1 endometrioid adenocarcinoma and polypoid tissue from the lower uterine segment and endocervix showed stromal invasion.         12/9/2016 Imaging     Pre-OP CT negative for evidence of metastasis         12/20/2016 Surgery     RTLH/BSO with limited lymph node dissection. Frozen section showed no residual cancer. Final pathology revealed small focus of grade 1 endometrioid adenocarcinoma superficially invasive (2mm / 2.5 cm). There was no additional cervical involvement, all sampled nodes were negative, no LVSI, and tissue insufficient for MSI testing. Final Stage II         2/22/2017 - 3/3/2017 Radiation     Brachytherapy with Dr. Livingston. The upper vagina received 30 Gy in 5 fractions utilizing a vaginal cylinder.          1/18/2018 Survivorship     Survivorship Care Plan completed and discussed with patient.  Copy of Survivorship Care Plan provided to patient and primary care provider.             Past Medical History:   Diagnosis Date   • Anxiety    • Arthritis    • BMI 38.0-38.9,adult    • Diabetes mellitus type 2,  diet-controlled (CMS/HCC)    • Endometrial cancer (CMS/HCC)    • Hyperlipemia    • Hypertension    • Palpitations        Past Surgical History:   Procedure Laterality Date   • BREAST BIOPSY     • KNEE ARTHROSCOPY W/ PARTIAL MEDIAL MENISCECTOMY     • LAPAROSCOPIC CHOLECYSTECTOMY  1999   • TONSILLECTOMY     • TOTAL LAPAROSCOPIC HYSTERECTOMY N/A 12/20/2016    RATLH/BSO/Staging       MEDICATIONS: The current medication list was reviewed and reconciled.     Allergies:  has No Known Allergies.    Family History   Problem Relation Age of Onset   • Hypertension Mother    • Diabetes type II Mother    • Heart failure Mother    • Diabetes type II Father    • Heart attack Father    • Prostate cancer Maternal Uncle 60       Last imaging study was pre-op CT 12/15/2016      Tumor marker:     Date Value Ref Range Status   12/19/2016 14.0 0.0 - 30.2 U/mL Final       Review of Systems   Constitutional: Negative for appetite change, chills, fatigue, fever and unexpected weight change.   Respiratory: Negative for cough, shortness of breath and wheezing.    Cardiovascular: Negative for chest pain, palpitations and leg swelling.   Gastrointestinal: Negative for abdominal distention, abdominal pain, blood in stool, constipation, diarrhea, nausea and vomiting.   Endocrine: Negative.    Genitourinary: Negative for dyspareunia, dysuria, frequency, genital sores, hematuria, pelvic pain, urgency, vaginal bleeding, vaginal discharge and vaginal pain.   Musculoskeletal: Negative for arthralgias, gait problem and joint swelling.   Neurological: Negative for dizziness, seizures, syncope, weakness, light-headedness, numbness and headaches.   Hematological: Negative for adenopathy.   Psychiatric/Behavioral: Negative.        Physical Exam  Vital Signs: /76   Pulse 83   Temp 97.9 °F (36.6 °C) (Temporal)   Resp 12   Wt 106 kg (233 lb)   LMP 06/03/2016 (Approximate)   SpO2 98%   BMI 39.38 kg/m²    General Appearance:  alert,  cooperative, no apparent distress, appears stated age and obese   Neurologic/Psychiatric: A&O x 3, gait steady, appropriate affect   HEENT:  Normocephalic, without obvious abnormality, mucous membranes moist   Neck: Supple, symmetrical, trachea midline, no adenopathy;  No thyromegaly, masses, or tenderness   Back:   Symmetric, no curvature, ROM normal, no CVA tenderness   Lungs:   Clear to auscultation bilaterally; respirations regular, even, and unlabored bilaterally   Heart:  Regular rate and rhythm, no murmurs appreciated   Breasts:  deferred   Abdomen:   Soft, non-tender, non-distended, no organomegaly and Exam limited d/t habitus.   Lymph nodes: No cervical, supraclavicular, inguinal adenopathy noted   Extremities: Normal, atraumatic; no clubbing, cyanosis, or edema    Pelvic: External Genitalia  without lesions or skin changes  Vagina  is pink, moist, without lesions.   Vaginal Cuff  Female Vaginal Cuff: smooth, intact, without visible lesions and mild radiation change noted  Uterus  surgically absent and no palpable masses  Ovaries  surgically absent bilaterallly  Parametria  smooth  Rectovaginal  Female rectovaginal: deferred     ECOG Performance Status: 0 - Asymptomatic    Procedure Note:  No notes on file      Assessment and Plan:  Tracy was seen today for follow-up.    Diagnoses and all orders for this visit:    Endometrial cancer (CMS/HCC)        There is no evidence of disease upon today's exam. She is now 2 years out from completion of treatment with no evidence of recurrence. Per Dr. Riley's original surveillance plan, she may now go to yearly surveillance visits. She is understanding to call with any changes in pelvic symptoms or general GYN concerns at any time between regularly scheduled visits.       Return to clinic in 1 year for ongoing cancer surveillance.      Kat Phipps, IDANIA        Note: Speech recognition transcription software was used to dictate portions of this document.   An attempt at proofreading has been made though minor errors in transcription may still be present.  Please do not hesitate to call our office with any questions.

## 2020-03-02 ENCOUNTER — OFFICE VISIT (OUTPATIENT)
Dept: GYNECOLOGIC ONCOLOGY | Facility: CLINIC | Age: 60
End: 2020-03-02

## 2020-03-02 VITALS
RESPIRATION RATE: 16 BRPM | WEIGHT: 226 LBS | SYSTOLIC BLOOD PRESSURE: 142 MMHG | BODY MASS INDEX: 38.19 KG/M2 | TEMPERATURE: 98.6 F | DIASTOLIC BLOOD PRESSURE: 82 MMHG | OXYGEN SATURATION: 96 % | HEART RATE: 97 BPM

## 2020-03-02 DIAGNOSIS — C54.1 ENDOMETRIAL CANCER (HCC): ICD-10-CM

## 2020-03-02 DIAGNOSIS — Z01.419 WELL WOMAN EXAM WITH ROUTINE GYNECOLOGICAL EXAM: Primary | ICD-10-CM

## 2020-03-02 PROCEDURE — 99396 PREV VISIT EST AGE 40-64: CPT | Performed by: NURSE PRACTITIONER

## 2020-03-02 RX ORDER — MELOXICAM 15 MG/1
15 TABLET ORAL AS NEEDED
COMMUNITY
Start: 2020-02-22

## 2020-03-02 RX ORDER — METFORMIN HYDROCHLORIDE 500 MG/1
TABLET, EXTENDED RELEASE ORAL
COMMUNITY
Start: 2020-03-01 | End: 2022-01-04

## 2020-03-02 NOTE — PROGRESS NOTES
GYN ONCOLOGY CANCER SURVEILLANCE FOLLOW-UP    Tracy Benz  [id number]  1960    Chief Complaint: No chief complaint on file.        History of present illness:  Tracy Benz is a 59 y.o. year old female who is here today for ongoing surveillance of Endometrial Cancer, see Cancer History. She reports she is feeling very well today and has no complaints. She denies vaginal bleeding, pelvic pain, and changes in bowel or bladder function. She states the bloating and yellow vaginal discharge she was having a month ago has subsided. She is now celebrating 2 years from completion of treatement. She will be doing some shopping after her visit today, looking for new luggage as she travels often for work.     Cancer History:      Endometrial cancer (CMS/HCC)    11/30/2016 Initial Diagnosis     Hysteroscopy D&C with MyoSure and cervical polypectomy with Dr. Tilley for [facility]. Pathology revealed grade 1 endometrioid adenocarcinoma and polypoid tissue from the lower uterine segment and endocervix showed stromal invasion.      12/9/2016 Imaging     Pre-OP CT negative for evidence of metastasis      12/20/2016 Surgery     RTLH/BSO with limited lymph node dissection. Frozen section showed no residual cancer. Final pathology revealed small focus of grade 1 endometrioid adenocarcinoma superficially invasive (2mm / 2.5 cm). There was no additional cervical involvement, all sampled nodes were negative, no LVSI, and tissue insufficient for MSI testing. Final Stage II      2/22/2017 - 3/3/2017 Radiation     Brachytherapy with Dr. Livingston. The upper vagina received 30 Gy in 5 fractions utilizing a vaginal cylinder.       1/18/2018 Survivorship     Survivorship Care Plan completed and discussed with patient.  Copy of Survivorship Care Plan provided to patient and primary care provider.          Past Medical History:   Diagnosis Date   • Anxiety    • Arthritis    • BMI 38.0-38.9,adult    • Diabetes mellitus type 2,  diet-controlled (CMS/HCC)    • Endometrial cancer (CMS/HCC)    • Hyperlipemia    • Hypertension    • Palpitations        Past Surgical History:   Procedure Laterality Date   • BREAST BIOPSY     • KNEE ARTHROSCOPY W/ PARTIAL MEDIAL MENISCECTOMY     • LAPAROSCOPIC CHOLECYSTECTOMY  1999   • TONSILLECTOMY     • TOTAL LAPAROSCOPIC HYSTERECTOMY N/A 12/20/2016    RATLH/BSO/Staging       MEDICATIONS: The current medication list was reviewed and reconciled.     Allergies:  has No Known Allergies.    Family History   Problem Relation Age of Onset   • Hypertension Mother    • Diabetes type II Mother    • Heart failure Mother    • Diabetes type II Father    • Heart attack Father    • Prostate cancer Maternal Uncle 60       Last imaging study was pre-op CT 12/15/2016      Tumor marker:     Date Value Ref Range Status   12/19/2016 14.0 0.0 - 30.2 U/mL Final       Review of Systems   Constitutional: Negative for appetite change, chills, fatigue, fever and unexpected weight change.   Respiratory: Negative for cough, shortness of breath and wheezing.    Cardiovascular: Negative for chest pain, palpitations and leg swelling.   Gastrointestinal: Negative for abdominal distention, abdominal pain, blood in stool, constipation, diarrhea, nausea and vomiting.   Endocrine: Negative.    Genitourinary: Negative for dyspareunia, dysuria, frequency, genital sores, hematuria, pelvic pain, urgency, vaginal bleeding, vaginal discharge and vaginal pain.   Musculoskeletal: Negative for arthralgias, gait problem and joint swelling.   Neurological: Negative for dizziness, seizures, syncope, weakness, light-headedness, numbness and headaches.   Hematological: Negative for adenopathy.   Psychiatric/Behavioral: Negative.        Physical Exam  Vital Signs: LMP 06/03/2016 (Approximate)    General Appearance:  alert, cooperative, no apparent distress, appears stated age and obese   Neurologic/Psychiatric: A&O x 3, gait steady, appropriate affect    HEENT:  Normocephalic, without obvious abnormality, mucous membranes moist   Neck: Supple, symmetrical, trachea midline, no adenopathy;  No thyromegaly, masses, or tenderness   Back:   Symmetric, no curvature, ROM normal, no CVA tenderness   Lungs:   Clear to auscultation bilaterally; respirations regular, even, and unlabored bilaterally   Heart:  Regular rate and rhythm, no murmurs appreciated   Breasts:  deferred   Abdomen:   Soft, non-tender, non-distended, no organomegaly and Exam limited d/t habitus.   Lymph nodes: No cervical, supraclavicular, inguinal adenopathy noted   Extremities: Normal, atraumatic; no clubbing, cyanosis, or edema    Pelvic: External Genitalia  without lesions or skin changes  Vagina  is pink, moist, without lesions.   Vaginal Cuff  Female Vaginal Cuff: smooth, intact, without visible lesions and mild radiation change noted  Uterus  surgically absent and no palpable masses  Ovaries  surgically absent bilaterallly  Parametria  smooth  Rectovaginal  Female rectovaginal: deferred     ECOG Performance Status: 0 - Asymptomatic    Procedure Note:  No notes on file      Assessment and Plan:  There are no diagnoses linked to this encounter.    There is no evidence of disease upon today's exam. She is now 2 years out from completion of treatment with no evidence of recurrence. Per Dr. Riley's original surveillance plan, she may now go to yearly surveillance visits. She is understanding to call with any changes in pelvic symptoms or general GYN concerns at any time between regularly scheduled visits.       Return to clinic in 1 year for ongoing cancer surveillance.      Sierra Reyes MA        Note: Speech recognition transcription software was used to dictate portions of this document.  An attempt at proofreading has been made though minor errors in transcription may still be present.  Please do not hesitate to call our office with any questions.

## 2020-03-02 NOTE — PROGRESS NOTES
GYN ONCOLOGY ANNUAL WELL WOMAN VISIT      Tracy Benz  [id number]  1960      Chief Complaint: Annual Exam (no gyn complaints)        History of present illness:  Tracy Benz is a 59 y.o. year old female who is here today for an annual exam and ongoing Endometrial cancer surveillance, see history below. She is now 3 years out from completion of treatment. She reports she is feeling very well today and has no complaints. She denies vaginal bleeding, pelvic pain, changes in bowel or bladder function, new or concerning lesions, and breast problems. She has recently been started on Metformin and Meloxicam. She is taking the Meloxicam for her knee pain in hopes of avoiding knee replacement surgery. All of her well woman studies are UTD.      Cancer History:      Endometrial cancer (CMS/HCC)    2016 Initial Diagnosis     Hysteroscopy D&C with MyoSure and cervical polypectomy with Dr. Tilley for [facility]. Pathology revealed grade 1 endometrioid adenocarcinoma and polypoid tissue from the lower uterine segment and endocervix showed stromal invasion.      2016 Imaging     Pre-OP CT negative for evidence of metastasis      2016 Surgery     RTLH/BSO with limited lymph node dissection. Frozen section showed no residual cancer. Final pathology revealed small focus of grade 1 endometrioid adenocarcinoma superficially invasive (2mm / 2.5 cm). There was no additional cervical involvement, all sampled nodes were negative, no LVSI, and tissue insufficient for MSI testing. Final Stage II      2017 - 3/3/2017 Radiation     Brachytherapy with Dr. Livingston. The upper vagina received 30 Gy in 5 fractions utilizing a vaginal cylinder.       2018 Survivorship     Survivorship Care Plan completed and discussed with patient.  Copy of Survivorship Care Plan provided to patient and primary care provider.          Obstetric History:  OB History        0    Para   0    Term   0       0    AB   0     Living   0       SAB   0    TAB   0    Ectopic   0    Molar        Multiple   0    Live Births                   Menstrual History:     Patient's last menstrual period was 06/03/2016 (approximate).          Past Medical History:   Diagnosis Date   • Anxiety    • Arthritis    • BMI 38.0-38.9,adult    • Diabetes mellitus type 2, diet-controlled (CMS/HCC)    • Endometrial cancer (CMS/HCC)    • Hyperlipemia    • Hypertension    • Palpitations        Past Surgical History:   Procedure Laterality Date   • BREAST BIOPSY     • KNEE ARTHROSCOPY W/ PARTIAL MEDIAL MENISCECTOMY     • LAPAROSCOPIC CHOLECYSTECTOMY  1999   • TONSILLECTOMY     • TOTAL LAPAROSCOPIC HYSTERECTOMY N/A 12/20/2016    RATLH/BSO/Staging       MEDICATIONS: The current medication list was reviewed and reconciled.     Allergies:  has No Known Allergies.    Family History   Problem Relation Age of Onset   • Hypertension Mother    • Diabetes type II Mother    • Heart failure Mother    • Diabetes type II Father    • Heart attack Father    • Prostate cancer Maternal Uncle 60       Health Maintenance:  Last mammogram was 10/2019. Last colonoscopy was 2018, with recommended follow-up in 5 year(s). Last pap smear was 2019, results were  normal PAP..    Last imaging study was 12/15/2016, CT ABD Pelvis w/ contrast.  Tumor marker:      Date Value Ref Range Status   12/19/2016 14.0 0.0 - 30.2 U/mL Final       Review of Systems   Constitutional: Negative for fatigue, fever and unexpected weight change.   HENT: Negative for congestion, ear pain, hearing loss, sinus pressure and trouble swallowing.    Eyes: Negative for visual disturbance.   Respiratory: Negative for cough, chest tightness, shortness of breath and wheezing.    Cardiovascular: Negative for chest pain, palpitations and leg swelling.   Gastrointestinal: Negative for abdominal distention, abdominal pain, constipation, diarrhea, nausea and vomiting.   Endocrine: Negative for cold intolerance, heat  intolerance, polydipsia, polyphagia and polyuria.   Genitourinary: Negative for difficulty urinating, dyspareunia, dysuria, frequency, hematuria, pelvic pain, urgency, vaginal bleeding, vaginal discharge and vaginal pain.   Musculoskeletal: Negative for arthralgias, gait problem, joint swelling and myalgias.   Skin: Negative for color change, pallor and rash.   Neurological: Negative for dizziness, seizures, syncope, weakness, light-headedness, numbness and headaches.   Hematological: Negative for adenopathy. Does not bruise/bleed easily.   Psychiatric/Behavioral: Negative for agitation, confusion, sleep disturbance and suicidal ideas. The patient is not nervous/anxious.        Routine Depression Screening performed. PHQ-9 Total Score: 0      Physical Exam  Vital Signs: /84   Pulse 97   Temp 98.6 °F (37 °C) (Temporal)   Resp 16   Wt 103 kg (226 lb)   LMP 06/03/2016 (Approximate)   SpO2 96%   BMI 38.19 kg/m²   Vitals:    03/02/20 0830   PainSc: 0-No pain           General Appearance:  alert, cooperative, no apparent distress, appears stated age and obese   Neurologic/Psychiatric: A&O x 3, gait steady, appropriate affect   HEENT:  Normocephalic, without obvious abnormality, mucous membranes moist   Neck: Supple, symmetrical, trachea midline, no adenopathy;  No thyromegaly, masses, or tenderness   Back:   Symmetric, no curvature, ROM normal, no CVA tenderness   Lungs:   Clear to auscultation bilaterally; respirations regular, even, and unlabored bilaterally   Heart:  Regular rate and rhythm, no murmurs appreciated   Breasts:  Symmetrical, no masses, no lesions and no nipple discharge   Abdomen:   Soft, non-tender, non-distended, no organomegaly and Exam limited d/t habitus.   Lymph nodes: No cervical, supraclavicular, inguinal or axillary adenopathy noted   Extremities: Normal, atraumatic; no clubbing, cyanosis, or edema    Skin: No rashes, ulcers, or suspicious lesions noted   Pelvic: External Genitalia   without lesions or skin changes  Vagina  is pink, moist, without lesions.  and changes consistent with previous radiation.  Vaginal Cuff  Female Vaginal Cuff: smooth, intact, without visible lesions and changes consistent with previous radiation  Uterus  surgically absent and no palpable masses  Ovaries  surgically absent bilaterally  Parametria  smooth  Rectovaginal  Female rectovaginal: confirms no masses or bleeding and Hemoccult negative     ECOG Performance Status: (0) Fully Active - Able to Carry On All Pre-disease Performance Without Restriction    Procedure Note:  No notes on file    Assessment and Plan:    Tracy was seen today for annual exam.    Diagnoses and all orders for this visit:    Well woman exam with routine gynecological exam    Endometrial cancer (CMS/[facility])        There is no evidence of disease upon today's exam. She is understanding to call with any changes in pelvic symptoms or general GYN concerns at any time between regularly scheduled visits.     Pap was done today. If she does not receive the results of the Pap within 2 weeks  time, she was instructed to call to find out the results.      She was encouraged to get yearly mammograms.  She should report any palpable breast lump(s) or skin changes regardless of mammographic findings.  I explained to Tracy that notification regarding her mammogram results will come from the center performing the study.  Our office will not be routinely calling with mammogram results.  It is her responsibility to make sure that the results from the mammogram are communicated to her by the breast center.  If she has any questions about the results, she is welcome to call our office anytime.    Repeat colonoscopy in 2023 or sooner if new problems.     Pain assessment was performed today as a part of patient’s care. For patients with pain related to surgery, gynecologic malignancy or cancer treatment, the plan is as noted in the assessment/plan.  For patients with  pain not related to these issues, they are to seek any further needed care from a more appropriate provider, such as PCP.    Advance Care Planning   ACP discussion was declined by the patient.       Return to clinic in 1 year for Annual exam and ongoing cancer surveillance.

## 2021-03-03 ENCOUNTER — OFFICE VISIT (OUTPATIENT)
Dept: GYNECOLOGIC ONCOLOGY | Facility: CLINIC | Age: 61
End: 2021-03-03

## 2021-03-03 VITALS
DIASTOLIC BLOOD PRESSURE: 70 MMHG | RESPIRATION RATE: 16 BRPM | WEIGHT: 176 LBS | TEMPERATURE: 96.8 F | OXYGEN SATURATION: 98 % | HEART RATE: 72 BPM | BODY MASS INDEX: 30.05 KG/M2 | SYSTOLIC BLOOD PRESSURE: 135 MMHG | HEIGHT: 64 IN

## 2021-03-03 DIAGNOSIS — C54.1 ENDOMETRIAL CANCER (HCC): ICD-10-CM

## 2021-03-03 DIAGNOSIS — Z01.419 WELL WOMAN EXAM WITH ROUTINE GYNECOLOGICAL EXAM: Primary | ICD-10-CM

## 2021-03-03 PROCEDURE — 99396 PREV VISIT EST AGE 40-64: CPT | Performed by: NURSE PRACTITIONER

## 2021-03-03 NOTE — PROGRESS NOTES
GYN ONCOLOGY ANNUAL WELL WOMAN VISIT      Tracy Benz  [id number]  1960      Subjective   Chief Complaint: Annual Exam (no complaints)        History of present illness:      Tracy Benz is a 60 y.o. year old female who is here today for an annual exam and ongoing surveillance of endometrial cancer, see history below. Since her last visit she has lost an additional 50 pounds with [employer]. She has noticed constipation from changing her diet and is using stool softeners PRN.  Otherwise, She reports she is feeling very well today and has no complaints. She denies vaginal bleeding, pelvic pain, changes in bowel or bladder function, new or concerning lesions, and breast problems. She got her annual screening mammogram in January and had a call back to get a diagnostic mammogram, results showed left breast cyst. Colonoscopy and DEXA are UTD. She has questions about becoming sexually active and how that can be different after having a hysterectomy/radiation.      Cancer History:   Oncology/Hematology History   Endometrial cancer (CMS/HCC)   11/30/2016 Initial Diagnosis    Hysteroscopy D&C with MyoSure and cervical polypectomy with Dr. Tilley for [facility]. Pathology revealed grade 1 endometrioid adenocarcinoma and polypoid tissue from the lower uterine segment and endocervix showed stromal invasion.     12/9/2016 Imaging    Pre-OP CT negative for evidence of metastasis     12/20/2016 Surgery    RTLH/BSO with limited lymph node dissection. Frozen section showed no residual cancer. Final pathology revealed small focus of grade 1 endometrioid adenocarcinoma superficially invasive (2mm / 2.5 cm). There was no additional cervical involvement, all sampled nodes were negative, no LVSI, and tissue insufficient for MSI testing. Final Stage II     2/22/2017 - 3/3/2017 Radiation    Brachytherapy with Dr. Livingston. The upper vagina received 30 Gy in 5 fractions utilizing a vaginal cylinder.      1/18/2018  Survivorship    Survivorship Care Plan completed and discussed with patient.  Copy of Survivorship Care Plan provided to patient and primary care provider.          Obstetric History:  OB History        0    Para   0    Term   0       0    AB   0    Living   0       SAB   0    TAB   0    Ectopic   0    Molar        Multiple   0    Live Births                   Menstrual History:     Patient's last menstrual period was 2016 (approximate).          The current medication list and allergy list were reviewed and reconciled.     Past Medical History, Past Surgical History, Social History, Family History have been reviewed and are without significant changes except as mentioned.    Health Maintenance:  Last mammogram was 2021. Last colonoscopy was , with recommended follow-up in 10 year(s). Last DEXA was 2021. Last pap smear was , results were  normal PAP..        Review of Systems   Constitutional: Negative for fatigue, fever and unexpected weight change.        Intentional weight loss   HENT: Negative for congestion, ear pain, hearing loss, sinus pressure and trouble swallowing.    Eyes: Negative for visual disturbance.   Respiratory: Negative for cough, chest tightness, shortness of breath and wheezing.    Cardiovascular: Negative for chest pain, palpitations and leg swelling.   Gastrointestinal: Positive for constipation. Negative for abdominal distention, abdominal pain, diarrhea, nausea and vomiting.        Diet induced change in bowels    Endocrine: Negative for cold intolerance, heat intolerance, polydipsia, polyphagia and polyuria.   Genitourinary: Negative for difficulty urinating, dyspareunia, dysuria, frequency, hematuria, pelvic pain, urgency, vaginal bleeding, vaginal discharge and vaginal pain.   Musculoskeletal: Negative for arthralgias, gait problem, joint swelling and myalgias.   Skin: Negative for color change, pallor and rash.   Neurological: Negative for dizziness,  "seizures, syncope, weakness, light-headedness, numbness and headaches.   Hematological: Negative for adenopathy. Does not bruise/bleed easily.   Psychiatric/Behavioral: Negative for agitation, confusion, sleep disturbance and suicidal ideas. The patient is not nervous/anxious.        Objective   Physical Exam  Vital Signs: /70 Comment: LUE  Pulse 72   Temp 96.8 °F (36 °C) (Infrared)   Resp 16   Ht 162.6 cm (64\")   Wt 79.8 kg (176 lb)   LMP 06/03/2016 (Approximate)   SpO2 98% Comment: RA  BMI 30.21 kg/m²      Wt Readings from Last 10 Encounters:   03/03/21 79.8 kg (176 lb)   03/02/20 103 kg (226 lb)   03/01/19 106 kg (233 lb)   01/24/19 103 kg (227 lb)   08/24/18 102 kg (224 lb)   01/18/18 103 kg (228 lb)   10/23/17 105 kg (232 lb 3.2 oz)   07/28/17 103 kg (226 lb)   07/21/17 104 kg (230 lb)   04/12/17 102 kg (224 lb)       Vitals:    03/03/21 0844   PainSc: 0-No pain           General Appearance:  alert, cooperative, no apparent distress and appears stated age   Neurologic/Psychiatric: A&O x 3, gait steady, appropriate affect   Lungs:   Clear to auscultation bilaterally; respirations regular, even, and unlabored bilaterally   Heart:  Regular rate and rhythm, no murmurs appreciated   Breasts:  Symmetrical, no masses, no lesions and no nipple discharge   Abdomen:   Soft, non-tender, non-distended and no organomegaly   Lymph nodes: No cervical, supraclavicular, inguinal or axillary adenopathy noted   Extremities: Normal, atraumatic; no clubbing, cyanosis, or edema    Skin: No rashes, ulcers, or suspicious lesions noted   Pelvic: External Genitalia  without lesions or skin changes  Vagina  is pink, moist, without lesions.   Vaginal Cuff  Female Vaginal Cuff: smooth, intact, without visible lesions and pap obtained  Uterus  surgically absent and no palpable masses  Ovaries  surgically absent bilaterally  Parametria  smooth  Rectovaginal  Female rectovaginal: confirms no masses or bleeding and Hemoccult " negative     ECOG score: 0             Result Review :   Last imaging study was CT abdomen pelvis 12/15/2019.   Tumor marker:     Date Value Ref Range Status   12/19/2016 14.0 0.0 - 30.2 U/mL Final       PHQ-9 Total Score: 0    Procedure Note:  No notes on file       Assessment and Plan:      Diagnoses and all orders for this visit:    1. Well woman exam with routine gynecological exam (Primary)    2. Endometrial cancer (CMS/HCC)  -     Liquid-based Pap Smear, Diagnostic; Future          There is no evidence of disease upon today's exam. She is understanding to call with any changes in pelvic symptoms or general GYN concerns at any time between regularly scheduled visits.      Pap was done today. If she does not receive the results of the Pap within 2 weeks  time, she was instructed to call to find out the results.     She was encouraged to get yearly mammograms.  She should report any palpable breast lump(s) or skin changes regardless of mammographic findings.  I explained to Tracy that notification regarding her mammogram results will come from the center performing the study.  Our office will not be routinely calling with mammogram results.  It is her responsibility to make sure that the results from the mammogram are communicated to her by the breast center.  If she has any questions about the results, she is welcome to call our office anytime.    Colonoscopy due to repeat in 2023.    Dexa done 01/2021 and due to repeat in 2-3 year.    For her concerns about intercourse after hysterectomy/radiation we discussed silicone based lubricants to keep tissues hydrated during intercourse and use of OTC vaginal moisturizers PRN for dryness.     Patient congratulated on her weight loss success. She is very motivated to continue her healthier lifestyle.     Pain assessment was performed today as a part of patient’s care. For patients with pain related to surgery, gynecologic malignancy or cancer treatment, the plan  is as noted in the assessment/plan.  For patients with pain not related to these issues, they are to seek any further needed care from a more appropriate provider, such as PCP.      Follow-up:     Return to clinic in 1 year for Annual exam.      Electronically signed by IDANIA Bustos on 03/03/21 at 09:19 EST

## 2021-06-07 ENCOUNTER — TELEPHONE (OUTPATIENT)
Dept: GYNECOLOGIC ONCOLOGY | Facility: CLINIC | Age: 61
End: 2021-06-07

## 2021-06-07 NOTE — TELEPHONE ENCOUNTER
RN relayed pt's complaints to Dr. Dorman who stated the pt should get worked up by her PCP and if she is not better in 1 week to call and we can get scans if needed.     RN called and left v/m for pt with that plan.

## 2021-06-07 NOTE — TELEPHONE ENCOUNTER
Received VM from patient. She states that she has been having pelvic pain. She is being evaluated by PCP for UTI work up but she isn't presenting with normal symptoms and would like someone to call her to give her some guidance.

## 2021-06-07 NOTE — TELEPHONE ENCOUNTER
Pt called to state that a couple of weeks ago she was having UTI like symptoms and did a UA through her PCP and it was negative.  She said this weekend she went to a BBQ and ate food she wouldn't normally and had a lot of gas/cramps/UTI like pain but she couldn't really tell where it was coming from just that it was lower abdominal pain.  She said she did have the urgency of a UTI but no burning, no fever, etc.  She said she is usually constipated but is now having loose Bm's.  She stated that she has made an appt for 6/10 with her PCP to get worked up for another UTI but she wanted to let us know in case this was anything like symptoms of a possible cancer recurrence.

## 2022-01-03 ENCOUNTER — TELEPHONE (OUTPATIENT)
Dept: GYNECOLOGIC ONCOLOGY | Facility: CLINIC | Age: 62
End: 2022-01-03

## 2022-01-03 NOTE — TELEPHONE ENCOUNTER
Received VM from patient that she is having new discolored vaginal discharge. Attempted to call patient back. Left VM that she can be scheduled as early as tomorrow. Hub has permission to schedule whenever patient would like.

## 2022-01-04 ENCOUNTER — OFFICE VISIT (OUTPATIENT)
Dept: GYNECOLOGIC ONCOLOGY | Facility: CLINIC | Age: 62
End: 2022-01-04

## 2022-01-04 VITALS
HEIGHT: 64 IN | HEART RATE: 72 BPM | TEMPERATURE: 97.7 F | OXYGEN SATURATION: 100 % | DIASTOLIC BLOOD PRESSURE: 80 MMHG | WEIGHT: 175 LBS | SYSTOLIC BLOOD PRESSURE: 160 MMHG | BODY MASS INDEX: 29.88 KG/M2 | RESPIRATION RATE: 16 BRPM

## 2022-01-04 DIAGNOSIS — N89.8 VAGINAL DISCHARGE: Primary | ICD-10-CM

## 2022-01-04 DIAGNOSIS — Z85.42 HISTORY OF ENDOMETRIAL CANCER: ICD-10-CM

## 2022-01-04 PROCEDURE — 99213 OFFICE O/P EST LOW 20 MIN: CPT | Performed by: NURSE PRACTITIONER

## 2022-01-04 RX ORDER — IBUPROFEN 400 MG/1
400 TABLET ORAL 2 TIMES DAILY PRN
COMMUNITY

## 2022-01-04 NOTE — PROGRESS NOTES
GYN ONCOLOGY FOLLOW-UP    Tracy Benz  [id number]  1960    Subjective   Chief Complaint: Follow-up (New Vaginal Discharge )        History of present illness:     Tracy Benz is a 61 y.o. year old female who is here today for complaint of new vaginal discharge. She has a history of endometrial cancer as outlined below. She is approaching 5 years since completion of treatment with surgery and adjuvant vaginal brachytherapy. Patient describes pink-tinge spotting following use of her vaginal dilator several days ago, now having yellow vaginal discharge. She denies pain with or without dilator use. She denies any obvious bleeding. Denies bowel or bladder changes.       Oncology History:    Oncology/Hematology History   Endometrial cancer (HCC)   11/30/2016 Initial Diagnosis    Hysteroscopy D&C with MyoSure and cervical polypectomy with Dr. Tilley for [facility]. Pathology revealed grade 1 endometrioid adenocarcinoma and polypoid tissue from the lower uterine segment and endocervix showed stromal invasion.     12/9/2016 Imaging    Pre-OP CT negative for evidence of metastasis     12/20/2016 Surgery    RTLH/BSO with limited lymph node dissection. Frozen section showed no residual cancer. Final pathology revealed small focus of grade 1 endometrioid adenocarcinoma superficially invasive (2mm / 2.5 cm). There was no additional cervical involvement, all sampled nodes were negative, no LVSI, and tissue insufficient for MSI testing. Final Stage II     2/22/2017 - 3/3/2017 Radiation    Brachytherapy with Dr. Livingtson. The upper vagina received 30 Gy in 5 fractions utilizing a vaginal cylinder.      1/18/2018 Survivorship    Survivorship Care Plan completed and discussed with patient.  Copy of Survivorship Care Plan provided to patient and primary care provider.            The current medication list and allergy list were reviewed and reconciled.     Past Medical History, Past Surgical History, Social History, Family  "History have been reviewed and are without significant changes except as mentioned.      Review of Systems   Constitutional: Negative.    Gastrointestinal: Negative.    Genitourinary: Positive for vaginal discharge.       Objective   Physical Exam  Vital Signs: /80 Comment: LUE  Pulse 72   Temp 97.7 °F (36.5 °C) (Infrared)   Resp 16   Ht 162.6 cm (64\")   Wt 79.4 kg (175 lb)   LMP 06/03/2016 (Approximate)   SpO2 100% Comment: RA  BMI 30.04 kg/m²    Wt Readings from Last 10 Encounters:   01/04/22 79.4 kg (175 lb)   03/03/21 79.8 kg (176 lb)   03/02/20 103 kg (226 lb)   03/01/19 106 kg (233 lb)   01/24/19 103 kg (227 lb)   08/24/18 102 kg (224 lb)   01/18/18 103 kg (228 lb)   10/23/17 105 kg (232 lb 3.2 oz)   07/28/17 103 kg (226 lb)   07/21/17 104 kg (230 lb)       Vitals:    01/04/22 1000   PainSc: 0-No pain           General Appearance:  alert, cooperative, no apparent distress and appears stated age   Neurologic/Psychiatric: A&O x 3, gait steady, appropriate affect   HEENT:  Normocephalic, without obvious abnormality, mucous membranes moist   Abdomen:   Soft, non-tender, non-distended and no organomegaly   Lymph nodes: No cervical, supraclavicular, inguinal or axillary adenopathy noted   Extremities: Normal, atraumatic; no clubbing, cyanosis, or edema    Pelvic: External Genitalia  without lesions or skin changes  Vagina  is pink, moist, without lesions.   Vaginal Cuff  Female Vaginal Cuff: smooth, intact and without visible lesions  Uterus  surgically absent and no palpable masses  Ovaries  surgically absent bilaterally  Parametria  smooth  Rectovaginal  Female rectovaginal: deferred     ECOG score: 0             Procedure Notes:  No notes on file           Assessment and Plan:    Diagnoses and all orders for this visit:    1. Vaginal discharge (Primary)    2. History of endometrial cancer          There is no evidence of disease upon today's exam. Patient reassured there is no abnormal discharge, " lesions, or masses apparent. She may have occasional discharge or pink-tinged spotting following dilator use, exams, or intercourse due to radiation changes to the tissues. She is approaching 5 years out from treatment completion. We will plan for one more visit here, then if doing well she may be released to routine care. She is understanding to call with any changes in pelvic symptoms or general GYN concerns at any time between regularly scheduled visits.     Pain assessment was performed today as a part of patient’s care.  For patients with pain related to surgery, gynecologic malignancy or cancer treatment, the plan is as noted in the assessment/plan.  For patients with pain not related to these issues, they are to seek any further needed care from a more appropriate provider, such as PCP.      Follow-up:     Return to clinic in 1 year for Annual exam.      Electronically signed by IDANIA Bustos on 01/04/22 at 13:14 EST

## 2023-05-19 ENCOUNTER — TELEPHONE (OUTPATIENT)
Dept: GYNECOLOGIC ONCOLOGY | Facility: CLINIC | Age: 63
End: 2023-05-19
Payer: COMMERCIAL

## 2023-05-19 NOTE — TELEPHONE ENCOUNTER
Caller: Tracy Benz    Relationship to patient: Self    Best call back number: [phone]    Chief complaint: PATIENT CALLED TO RESCHEDULE THE APPOINTMENT SHE MISSED, HUB UNABLE TO SCHEDULE FOR APRN     Type of visit: PAP SMEAR/PELVIC EXAM    Requested date: CAN NOT DO 5-25-23    If rescheduling, when is the original appointment: 1-5-23     Additional notes: PLEASE CALL PATIENT TO SCHEDULE

## 2023-06-19 ENCOUNTER — OFFICE VISIT (OUTPATIENT)
Dept: GYNECOLOGIC ONCOLOGY | Facility: CLINIC | Age: 63
End: 2023-06-19
Payer: COMMERCIAL

## 2023-06-19 VITALS
RESPIRATION RATE: 18 BRPM | TEMPERATURE: 98.7 F | OXYGEN SATURATION: 98 % | DIASTOLIC BLOOD PRESSURE: 93 MMHG | WEIGHT: 214 LBS | HEART RATE: 85 BPM | SYSTOLIC BLOOD PRESSURE: 154 MMHG | BODY MASS INDEX: 40.4 KG/M2 | HEIGHT: 61 IN

## 2023-06-19 DIAGNOSIS — Z01.419 WELL WOMAN EXAM WITH ROUTINE GYNECOLOGICAL EXAM: Primary | ICD-10-CM

## 2023-06-19 DIAGNOSIS — Z85.42 HISTORY OF ENDOMETRIAL CANCER: ICD-10-CM

## 2023-06-19 DIAGNOSIS — Z76.89 ENCOUNTER TO ESTABLISH CARE WITH NEW DOCTOR: ICD-10-CM

## 2023-06-19 PROCEDURE — 99396 PREV VISIT EST AGE 40-64: CPT | Performed by: NURSE PRACTITIONER

## 2023-06-19 NOTE — PROGRESS NOTES
GYN ONCOLOGY ANNUAL WELL WOMAN VISIT      Tracy Benz  [id number]  1960      Subjective   Chief Complaint: Annual Exam (No gyn complaints)        History of present illness:      Tracy Benz is a 62 y.o. year old female who is here today for an annual exam and ongoing surveillance of endometrial cancer, see history below. She reports she is feeling very well today and has no complaints. She denies vaginal bleeding, pelvic pain, changes in bowel or bladder function, new or concerning lesions, and breast problems. All well woman screenings are currently  UTD. She is ready to return to routine gynecologic care pending all is well today. She inquires about referral for new PCP at [facility] for continuity of care. She admits she has unfortunately gained back some of the weight she previously lost, is motivated toward long-term weight loss and is trying to work toward slow, maintainable loss.         Cancer History:   Oncology/Hematology History   Endometrial cancer   11/30/2016 Initial Diagnosis    Hysteroscopy D&C with MyoSure and cervical polypectomy with Dr. Tilley for [facility]. Pathology revealed grade 1 endometrioid adenocarcinoma and polypoid tissue from the lower uterine segment and endocervix showed stromal invasion.     12/9/2016 Imaging    Pre-OP CT negative for evidence of metastasis     12/20/2016 Surgery    RTLH/BSO with limited lymph node dissection. Frozen section showed no residual cancer. Final pathology revealed small focus of grade 1 endometrioid adenocarcinoma superficially invasive (2mm / 2.5 cm). There was no additional cervical involvement, all sampled nodes were negative, no LVSI, and tissue insufficient for MSI testing. Final Stage II     2/22/2017 - 3/3/2017 Radiation    Brachytherapy with Dr. Livingston. The upper vagina received 30 Gy in 5 fractions utilizing a vaginal cylinder.      1/18/2018 Survivorship    Survivorship Care Plan completed and discussed with patient.  Copy of  "Survivorship Care Plan provided to patient and primary care provider.          Obstetric History:  OB History          0    Para   0    Term   0       0    AB   0    Living   0         SAB   0    IAB   0    Ectopic   0    Molar        Multiple   0    Live Births                   Menstrual History:     Patient's last menstrual period was 2016 (approximate).          The current medication list and allergy list were reviewed and reconciled.     Past Medical History, Past Surgical History, Social History, Family History have been reviewed and are without significant changes except as mentioned.    Health Maintenance:  Last mammogram was 2022. Last colonoscopy was , with recommended follow-up in 10 year(s). Last DEXA was 2021. Last pap smear was 3/3/2021, results were normal PAP.        Review of Systems   Constitutional: Negative.    Gastrointestinal: Negative.    Genitourinary: Negative.    Musculoskeletal:  Positive for arthralgias.   Breast: Negative      Objective   Physical Exam  Vital Signs: /93   Pulse 85   Temp 98.7 °F (37.1 °C) (Temporal)   Resp 18   Ht 154.9 cm (61\")   Wt 97.1 kg (214 lb)   LMP 2016 (Approximate)   SpO2 98%   BMI 40.43 kg/m²      Wt Readings from Last 10 Encounters:   23 97.1 kg (214 lb)   22 79.4 kg (175 lb)   21 79.8 kg (176 lb)   20 103 kg (226 lb)   19 106 kg (233 lb)   19 103 kg (227 lb)   18 102 kg (224 lb)   18 103 kg (228 lb)   10/23/17 105 kg (232 lb 3.2 oz)   17 103 kg (226 lb)       Vitals:    23 0840   PainSc: 0-No pain           General Appearance:  alert, cooperative, no apparent distress and appears stated age   Neurologic/Psych: A&O x 3, gait steady, appropriate affect   Lungs:   Clear to auscultation bilaterally; respirations regular, even, and unlabored bilaterally   Heart:  Regular rate and rhythm, no murmurs appreciated   Breasts:  Symmetrical, no masses, no " lesions and no nipple discharge   Abdomen:   Soft, non-tender, non-distended and no organomegaly   Lymph nodes: No cervical, supraclavicular, inguinal or axillary adenopathy noted   Extremities: Normal, atraumatic; no clubbing, cyanosis, or edema    Skin: No rashes, ulcers, or suspicious lesions noted   Pelvic: External Genitalia  without lesions or skin changes  Vagina  is pink, moist, without lesions.   Vaginal Cuff  Female Vaginal Cuff: smooth, intact, without visible lesions  Uterus  surgically absent and no palpable masses  Ovaries  surgically absent bilaterally  Parametria  smooth  Rectovaginal  Female rectovaginal: confirms no masses or bleeding and Hemoccult negative     ECOG score: 1             Result Review :   Last imaging study was CT abdomen pelvis 12/15/2019.   Tumor marker:     Date Value Ref Range Status   12/19/2016 14.0 0.0 - 30.2 U/mL Final       PHQ-9 Total Score:      Procedure Note:          Assessment and Plan:      Diagnoses and all orders for this visit:    1. Well woman exam with routine gynecological exam (Primary)    2. History of endometrial cancer    3. BMI 40.0-44.9, adult  -     Ambulatory Referral to Nutrition Services    4. Encounter to establish care with new doctor  -     Ambulatory Referral to Internal Medicine  -     Ambulatory Referral to Obstetrics / Gynecology          There is no evidence of disease upon today's exam. She may return to routine care. New referral placed today. She is understanding we will remain available for any problems or complications in the future.     No pap smear needed today.     She was encouraged to get yearly mammograms.  She should report any palpable breast lump(s) or skin changes regardless of mammographic findings.  I explained to Tracy that notification regarding her mammogram results will come from the center performing the study.  Our office will not be routinely calling with mammogram results.  It is her responsibility to make sure  that the results from the mammogram are communicated to her by the breast center.  If she has any questions about the results, she is welcome to call our office anytime.    Colonoscopy due to repeat in 2028.    Dexa done 01/2021 and due to repeat in 2-3 years.    Patient and I discussed her ups and downs with her weight. I recommended referral to dietician to talk through ways to make maintainable changes. She is in agreement and very motivated toward long term weight loss for a variety of health and lifestyle benefits.     We also discussed her desire to find a PCP through [facility]. I will place referral and they will call her to get her scheduled.       Pain assessment was performed today as a part of patient’s care. For patients with pain related to surgery, gynecologic malignancy or cancer treatment, the plan is as noted in the assessment/plan.  For patients with pain not related to these issues, they are to seek any further needed care from a more appropriate provider, such as PCP.      Follow-up:     Return to clinic PRN.      Electronically signed by IDANIA Bustos on 06/19/23 at 09:28 EDT

## 2024-10-17 ENCOUNTER — OFFICE VISIT (OUTPATIENT)
Dept: OBSTETRICS AND GYNECOLOGY | Facility: CLINIC | Age: 64
End: 2024-10-17
Payer: COMMERCIAL

## 2024-10-17 VITALS
WEIGHT: 217 LBS | DIASTOLIC BLOOD PRESSURE: 72 MMHG | BODY MASS INDEX: 40.97 KG/M2 | SYSTOLIC BLOOD PRESSURE: 130 MMHG | HEIGHT: 61 IN

## 2024-10-17 DIAGNOSIS — E89.40 SURGICAL MENOPAUSE: ICD-10-CM

## 2024-10-17 DIAGNOSIS — C54.1 ENDOMETRIAL CANCER: ICD-10-CM

## 2024-10-17 DIAGNOSIS — Z12.39 BREAST SCREENING: Primary | ICD-10-CM

## 2024-10-17 DIAGNOSIS — Z01.419 WOMEN'S ANNUAL ROUTINE GYNECOLOGICAL EXAMINATION: ICD-10-CM

## 2024-10-17 RX ORDER — METFORMIN HCL 500 MG
TABLET, EXTENDED RELEASE 24 HR ORAL
COMMUNITY
Start: 2024-10-04

## 2024-10-17 NOTE — PROGRESS NOTES
Subjective     Chief Complaint   Patient presents with    Gynecologic Exam     New patient annual referral GYN ONC (endometrial cancer) for follow ups       Tracy Benz is a 64 y.o. year old  presenting to be seen for her annual exam.      She is not sexually active.      She exercises regularly: no.  She wears her seat belt: yes.  She has concerns about domestic violence: no.  She has noticed changes in height: not asked  Health Maintenance for Postmenopausal Women  Menopause is a normal process in which your ability to get pregnant comes to an end. This process happens slowly over many months or years, usually between the ages of 48 and 55. Menopause is complete when you have missed your menstrual period for 12 months.  It is important to talk with your health care provider about some of the most common conditions that affect women after menopause (postmenopausal women). These include heart disease, cancer, and bone loss (osteoporosis). Adopting a healthy lifestyle and getting preventive care can help to promote your health and wellness. The actions you take can also lower your chances of developing some of these common conditions.  What are the signs and symptoms of menopause?  During menopause, you may have the following symptoms:  Hot flashes. These can be moderate or severe.  Night sweats.  Decrease in sex drive.  Mood swings.  Headaches.  Tiredness (fatigue).  Irritability.  Memory problems.  Problems falling asleep or staying asleep.  Talk with your health care provider about treatment options for your symptoms.  Do I need hormone replacement therapy?  Hormone replacement therapy is effective in treating symptoms that are caused by menopause, such as hot flashes and night sweats.  Hormone replacement carries certain risks, especially as you become older. If you are thinking about using estrogen or estrogen with progestin, discuss the benefits and risks with your health care provider.  How can  I reduce my risk for heart disease and stroke?  The risk of heart disease, heart attack, and stroke increases as you age. One of the causes may be a change in the body's hormones during menopause. This can affect how your body uses dietary fats, triglycerides, and cholesterol. Heart attack and stroke are medical emergencies. There are many things that you can do to help prevent heart disease and stroke.  Watch your blood pressure  High blood pressure causes heart disease and increases the risk of stroke. This is more likely to develop in people who have high blood pressure readings or are overweight.  Have your blood pressure checked:  Every 3-5 years if you are 18-39 years of age.  Every year if you are 40 years old or older.  Eat a healthy diet    Eat a diet that includes plenty of vegetables, fruits, low-fat dairy products, and lean protein.  Do not eat a lot of foods that are high in solid fats, added sugars, or sodium.  Get regular exercise  Get regular exercise. This is one of the most important things you can do for your health. Most adults should:  Try to exercise for at least 150 minutes each week. The exercise should increase your heart rate and make you sweat (moderate-intensity exercise).  Try to do strengthening exercises at least twice each week. Do these in addition to the moderate-intensity exercise.  Spend less time sitting. Even light physical activity can be beneficial.  Other tips  Work with your health care provider to achieve or maintain a healthy weight.  Do not use any products that contain nicotine or tobacco. These products include cigarettes, chewing tobacco, and vaping devices, such as e-cigarettes. If you need help quitting, ask your health care provider.  Know your numbers. Ask your health care provider to check your cholesterol and your blood sugar (glucose). Continue to have your blood tested as directed by your health care provider.  Do I need screening for cancer?  Depending on your  health history and family history, you may need to have cancer screenings at different stages of your life. This may include screening for:  Breast cancer.  Cervical cancer.  Lung cancer.  Colorectal cancer.  What is my risk for osteoporosis?  After menopause, you may be at increased risk for osteoporosis. Osteoporosis is a condition in which bone destruction happens more quickly than new bone creation. To help prevent osteoporosis or the bone fractures that can happen because of osteoporosis, you may take the following actions:  If you are 19-50 years old, get at least 1,000 mg of calcium and at least 600 international units (IU) of vitamin D per day.  If you are older than age 50 but younger than age 70, get at least 1,200 mg of calcium and at least 600 international units (IU) of vitamin D per day.  If you are older than age 70, get at least 1,200 mg of calcium and at least 800 international units (IU) of vitamin D per day.  Smoking and drinking excessive alcohol increase the risk of osteoporosis. Eat foods that are rich in calcium and vitamin D, and do weight-bearing exercises several times each week as directed by your health care provider.  How does menopause affect my mental health?  Depression may occur at any age, but it is more common as you become older. Common symptoms of depression include:  Feeling depressed.  Changes in sleep patterns.  Changes in appetite or eating patterns.  Feeling an overall lack of motivation or enjoyment of activities that you previously enjoyed.  Frequent crying spells.  Talk with your health care provider if you think that you are experiencing any of these symptoms.  General instructions  See your health care provider for regular wellness exams and vaccines. This may include:  Scheduling regular health, dental, and eye exams.  Getting and maintaining your vaccines. These include:  Influenza vaccine. Get this vaccine each year before the flu season begins.  Pneumonia  "vaccine.  Shingles vaccine.  Tetanus, diphtheria, and pertussis (Tdap) booster vaccine.  Your health care provider may also recommend other immunizations.  Tell your health care provider if you have ever been abused or do not feel safe at home.  Summary  Menopause is a normal process in which your ability to get pregnant comes to an end.  This condition causes hot flashes, night sweats, decreased interest in sex, mood swings, headaches, or lack of sleep.  Treatment for this condition may include hormone replacement therapy.  Take actions to keep yourself healthy, including exercising regularly, eating a healthy diet, watching your weight, and checking your blood pressure and blood sugar levels.  Get screened for cancer and depression. Make sure that you are up to date with all your vaccines.  GYN screening history:  Last pap: she reports her last PAP was normal  Last mammogram: she reports her last mammogram was normal  Last colonoscopy: she reports her last colonoscopy was normal.    No Additional Complaints Reported    The following portions of the patient's history were reviewed and updated as appropriate:vital signs, allergies, current medications, past medical history, past social history, past surgical history, and problem list.    Review of Systems  Increasing frequency of culture positive UTI's     Physical Exam    Objective     /72   Ht 154.9 cm (60.98\")   Wt 98.4 kg (217 lb)   LMP 07/08/2013 (Approximate)   Breastfeeding No   BMI 41.02 kg/m²     General:  well developed; well nourished  no acute distress  mentation appropriate  obese - Body mass index is 41.02 kg/m².   Constitutional: obese and healthy   Skin:  No suspicious lesions seen   Thyroid: normal to inspection and palpation   Lungs:  breathing is unlabored  clear to auscultation bilaterally   Heart:  regular rate and rhythm, S1, S2 normal, no murmur, click, rub or gallop   Breasts:  Examined in supine position  Symmetric without masses " or skin dimpling  Nipples normal without inversion, lesions or discharge  There are no palpable axillary nodes   Abdomen: soft, non-tender; no masses  no umbilical or inginual hernias are present  no hepato-splenomegaly   Pelvis: Exam limited by  vaginal atropy  Clinical staff was present for exam  External genitalia:  normal appearance of the external genitalia including Bartholin's and [place]'s glands.  :  urethral meatus normal; urethral hypermobility is absent.  Vaginal:  normal pink mucosa without prolapse or lesions.  Cervix:  absent  Uterus:  absent  Adnexa: normal   Musculoskeletal: negative   Neuro: normal without focal findings, mental status, speech normal, alert and oriented x3, and [id number]   Psych: oriented to time, place and person, mood and affect are within normal limits, pt is a good historian; no memory problems were noted       Lab Review   PAP    Imaging  Mammogram report    Assessment & Plan     ASSESSMENT  1. Breast screening    2. Surgical menopause    3. Women's annual routine gynecological examination    4. Endometrial cancer        PLAN  No orders of the defined types were placed in this encounter.    No orders of the defined types were placed in this encounter.        Follow up: 1 year(s)         This note was electronically signed.    Neville Jackson MD  October 18, 2024

## 2024-10-18 ENCOUNTER — PATIENT ROUNDING (BHMG ONLY) (OUTPATIENT)
Dept: OBSTETRICS AND GYNECOLOGY | Facility: CLINIC | Age: 64
End: 2024-10-18
Payer: COMMERCIAL

## 2024-10-18 PROBLEM — Z01.419 WOMEN'S ANNUAL ROUTINE GYNECOLOGICAL EXAMINATION: Status: ACTIVE | Noted: 2024-10-18

## 2024-10-18 NOTE — PROGRESS NOTES
My name is Cailin, clinical manager    I am with [facility]  [facility]  [address]  [facility] [id number] [phone]    I want to officially welcome you to our practice and ask about your recent visit.    Tell me about your visit with us.  What things went well?    We're always looking for ways to make our patients' experiences even better. Do you have recommendations on way we may improve?    Overall were you satisfied with your first visit to our practice?    I appreciate you taking the time to answer a few questions today. Is there anything else I can do for you?    Thank you, and have a great day.